# Patient Record
Sex: MALE | Race: WHITE | NOT HISPANIC OR LATINO | Employment: FULL TIME | URBAN - METROPOLITAN AREA
[De-identification: names, ages, dates, MRNs, and addresses within clinical notes are randomized per-mention and may not be internally consistent; named-entity substitution may affect disease eponyms.]

---

## 2018-04-16 ENCOUNTER — TRANSFERRED RECORDS (OUTPATIENT)
Dept: HEALTH INFORMATION MANAGEMENT | Facility: CLINIC | Age: 48
End: 2018-04-16

## 2018-04-25 ENCOUNTER — OFFICE VISIT (OUTPATIENT)
Dept: FAMILY MEDICINE | Facility: CLINIC | Age: 48
End: 2018-04-25
Payer: COMMERCIAL

## 2018-04-25 VITALS
TEMPERATURE: 98.2 F | RESPIRATION RATE: 18 BRPM | HEIGHT: 72 IN | SYSTOLIC BLOOD PRESSURE: 142 MMHG | BODY MASS INDEX: 38.6 KG/M2 | WEIGHT: 285 LBS | HEART RATE: 97 BPM | OXYGEN SATURATION: 97 % | DIASTOLIC BLOOD PRESSURE: 76 MMHG

## 2018-04-25 DIAGNOSIS — Z11.3 SCREENING EXAMINATION FOR VENEREAL DISEASE: ICD-10-CM

## 2018-04-25 DIAGNOSIS — E11.9 TYPE 2 DIABETES MELLITUS WITHOUT COMPLICATION, WITHOUT LONG-TERM CURRENT USE OF INSULIN (H): ICD-10-CM

## 2018-04-25 DIAGNOSIS — E66.01 MORBID OBESITY (H): ICD-10-CM

## 2018-04-25 DIAGNOSIS — M54.42 BILATERAL LOW BACK PAIN WITH LEFT-SIDED SCIATICA, UNSPECIFIED CHRONICITY: ICD-10-CM

## 2018-04-25 DIAGNOSIS — Z01.818 PREOP GENERAL PHYSICAL EXAM: Primary | ICD-10-CM

## 2018-04-25 DIAGNOSIS — Z23 NEED FOR PROPHYLACTIC VACCINATION WITH TETANUS-DIPHTHERIA (TD): ICD-10-CM

## 2018-04-25 LAB
ERYTHROCYTE [DISTWIDTH] IN BLOOD BY AUTOMATED COUNT: 14.5 % (ref 10–15)
HBA1C MFR BLD: 9.6 % (ref 0–5.6)
HCT VFR BLD AUTO: 42.6 % (ref 40–53)
HGB BLD-MCNC: 14.1 G/DL (ref 13.3–17.7)
MCH RBC QN AUTO: 26.9 PG (ref 26.5–33)
MCHC RBC AUTO-ENTMCNC: 33.1 G/DL (ref 31.5–36.5)
MCV RBC AUTO: 81 FL (ref 78–100)
PLATELET # BLD AUTO: 214 10E9/L (ref 150–450)
RBC # BLD AUTO: 5.25 10E12/L (ref 4.4–5.9)
WBC # BLD AUTO: 5.8 10E9/L (ref 4–11)

## 2018-04-25 PROCEDURE — 80048 BASIC METABOLIC PNL TOTAL CA: CPT | Performed by: PHYSICIAN ASSISTANT

## 2018-04-25 PROCEDURE — 85027 COMPLETE CBC AUTOMATED: CPT | Performed by: PHYSICIAN ASSISTANT

## 2018-04-25 PROCEDURE — 90715 TDAP VACCINE 7 YRS/> IM: CPT | Performed by: PHYSICIAN ASSISTANT

## 2018-04-25 PROCEDURE — 82043 UR ALBUMIN QUANTITATIVE: CPT | Performed by: PHYSICIAN ASSISTANT

## 2018-04-25 PROCEDURE — 36415 COLL VENOUS BLD VENIPUNCTURE: CPT | Performed by: PHYSICIAN ASSISTANT

## 2018-04-25 PROCEDURE — 87389 HIV-1 AG W/HIV-1&-2 AB AG IA: CPT | Performed by: PHYSICIAN ASSISTANT

## 2018-04-25 PROCEDURE — 87491 CHLMYD TRACH DNA AMP PROBE: CPT | Performed by: PHYSICIAN ASSISTANT

## 2018-04-25 PROCEDURE — 80061 LIPID PANEL: CPT | Performed by: PHYSICIAN ASSISTANT

## 2018-04-25 PROCEDURE — 99203 OFFICE O/P NEW LOW 30 MIN: CPT | Mod: 25 | Performed by: PHYSICIAN ASSISTANT

## 2018-04-25 PROCEDURE — 86780 TREPONEMA PALLIDUM: CPT | Performed by: PHYSICIAN ASSISTANT

## 2018-04-25 PROCEDURE — 83036 HEMOGLOBIN GLYCOSYLATED A1C: CPT | Performed by: PHYSICIAN ASSISTANT

## 2018-04-25 PROCEDURE — 84443 ASSAY THYROID STIM HORMONE: CPT | Performed by: PHYSICIAN ASSISTANT

## 2018-04-25 PROCEDURE — 87591 N.GONORRHOEAE DNA AMP PROB: CPT | Performed by: PHYSICIAN ASSISTANT

## 2018-04-25 PROCEDURE — 90471 IMMUNIZATION ADMIN: CPT | Performed by: PHYSICIAN ASSISTANT

## 2018-04-25 RX ORDER — METFORMIN HCL 500 MG
1000 TABLET, EXTENDED RELEASE 24 HR ORAL 2 TIMES DAILY WITH MEALS
COMMUNITY
End: 2018-05-07

## 2018-04-25 NOTE — Clinical Note
William CAZARES was to follow up with your office about his 9.6 A1c to see if you would still proceed with the procedure.

## 2018-04-25 NOTE — LETTER
April 30, 2018      Jason Moran  [address] [address]  [facility] [address]        Dear ,    We are writing to inform you of your test results.    - Your blood sugar and A1c are very high.  Please schedule a follow up appointment to discuss starting a second medication or starting insulin.  Your other labs are normal.    Resulted Orders   Basic metabolic panel   Result Value Ref Range    Sodium 137 133 - 144 mmol/L    Potassium 4.0 3.4 - 5.3 mmol/L    Chloride 103 94 - 109 mmol/L    Carbon Dioxide 25 20 - 32 mmol/L    Anion Gap 9 3 - 14 mmol/L    Glucose 277 (H) 70 - 99 mg/dL      Comment:      Non Fasting    Urea Nitrogen 10 7 - 30 mg/dL    Creatinine 0.62 (L) 0.66 - 1.25 mg/dL    GFR Estimate >90 >60 mL/min/1.7m2      Comment:      Non  GFR Calc    GFR Estimate If Black >90 >60 mL/min/1.7m2      Comment:       GFR Calc    Calcium 9.3 8.5 - 10.1 mg/dL   CBC with platelets   Result Value Ref Range    WBC 5.8 4.0 - 11.0 10e9/L    RBC Count 5.25 4.4 - 5.9 10e12/L    Hemoglobin 14.1 13.3 - 17.7 g/dL    Hematocrit 42.6 40.0 - 53.0 %    MCV 81 78 - 100 fl    MCH 26.9 26.5 - 33.0 pg    MCHC 33.1 31.5 - 36.5 g/dL    RDW 14.5 10.0 - 15.0 %    Platelet Count 214 150 - 450 10e9/L   Lipid panel reflex to direct LDL Non-fasting   Result Value Ref Range    Cholesterol 196 <200 mg/dL    Triglycerides 181 (H) <150 mg/dL      Comment:      Borderline high:  150-199 mg/dl  High:             200-499 mg/dl  Very high:       >499 mg/dl  Non Fasting      HDL Cholesterol 45 >39 mg/dL    LDL Cholesterol Calculated 115 (H) <100 mg/dL      Comment:      Above desirable:  100-129 mg/dl  Borderline High:  130-159 mg/dL  High:             160-189 mg/dL  Very high:       >189 mg/dl      Non HDL Cholesterol 151 (H) <130 mg/dL      Comment:      Above Desirable:  130-159 mg/dl  Borderline high:  160-189 mg/dl  High:             190-219 mg/dl  Very high:       >219 mg/dl     Hemoglobin A1c   Result Value Ref Range     Hemoglobin A1C 9.6 (H) 0 - 5.6 %      Comment:      Normal <5.7% Prediabetes 5.7-6.4%  Diabetes 6.5% or higher - adopted from ADA   consensus guidelines.     Neisseria gonorrhoeae PCR   Result Value Ref Range    Specimen Descrip Midstream Urine     N Gonorrhea PCR Negative NEG^Negative      Comment:      Negative for N. gonorrhoeae rRNA by transcription mediated amplification.  A negative result by transcription mediated amplification does not preclude   the presence of N. gonorrhoeae infection because results are dependent on   proper and adequate collection, absence of inhibitors, and sufficient rRNA to   be detected.     Chlamydia trachomatis PCR   Result Value Ref Range    Specimen Description Midstream Urine     Chlamydia Trachomatis PCR Negative NEG^Negative      Comment:      Negative for C. trachomatis rRNA by transcription mediated amplification.  A negative result by transcription mediated amplification does not preclude   the presence of C. trachomatis infection because results are dependent on   proper and adequate collection, absence of inhibitors, and sufficient rRNA to   be detected.     HIV Antigen Antibody Combo   Result Value Ref Range    HIV Antigen Antibody Combo Nonreactive NR^Nonreactive          Comment:      HIV-1 p24 Ag & HIV-1/HIV-2 Ab Not Detected   Anti Treponema   Result Value Ref Range    Treponema pallidum Antibody Negative NEG^Negative   TSH with free T4 reflex   Result Value Ref Range    TSH 1.79 0.40 - 4.00 mU/L   Albumin Random Urine Quantitative with Creat Ratio   Result Value Ref Range    Creatinine Urine 102 mg/dL    Albumin Urine mg/L 12 mg/L    Albumin Urine mg/g Cr 11.76 0 - 17 mg/g Cr       If you have any questions or concerns, please call the clinic at the number listed above.       Sincerely,        Stefani Currie PA-C

## 2018-04-25 NOTE — NURSING NOTE
"Chief Complaint   Patient presents with     Pre-Op Exam       Initial /76  Pulse 97  Temp 98.2  F (36.8  C) (Tympanic)  Resp 18  Ht 5' 11.5\" (1.816 m)  Wt 285 lb (129.3 kg)  SpO2 97%  BMI 39.2 kg/m2 Estimated body mass index is 39.2 kg/(m^2) as calculated from the following:    Height as of this encounter: 5' 11.5\" (1.816 m).    Weight as of this encounter: 285 lb (129.3 kg).  Medication Reconciliation: complete    "

## 2018-04-25 NOTE — PROGRESS NOTES
[facility]  [address]  [facility] [address]  [phone]  Dept: [phone]    PRE-OP EVALUATION:  Today's date: 2018    Jason Moran (: 1970) presents for pre-operative evaluation assessment as requested by Dr. Mk Rueda.  He requires evaluation and anesthesia risk assessment prior to undergoing surgery/procedure for treatment of back pain-DISCECTOMY LUMBAR POSTERIOR MICROSCOPIC ONE LEVEL.    Fax number for surgical facility:   Primary Physician: No Ref-Primary, Physician  Type of Anesthesia Anticipated: General    Patient has a Health Care Directive or Living Will:  NO    Preop Questions 2018   Who is doing your surgery? Dr Mk Rueda   What are you having done? back surgery L5   Date of Surgery/Procedure:    Facility or Hospital where procedure/surgery will be performed: [facility]   1.  Do you have a history of Heart attack, stroke, stent, coronary bypass surgery, or other heart surgery? No   2.  Do you ever have any pain or discomfort in your chest? No   3.  Do you have a history of  Heart Failure? No   4.   Are you troubled by shortness of breath when:  walking on a level surface, or up a slight hill, or at night? No   5.  Do you currently have a cold, bronchitis or other respiratory infection? No   6.  Do you have a cough, shortness of breath, or wheezing? No   7.  Do you sometimes get pains in the calves of your legs when you walk? No   8. Do you or anyone in your family have previous history of blood clots? No   9.  Do you or does anyone in your family have a serious bleeding problem such as prolonged bleeding following surgeries or cuts? No   10. Have you ever had problems with anemia or been told to take iron pills? No   11. Have you had any abnormal blood loss such as black, tarry or bloody stools? No   12. Have you ever had a blood transfusion? No   13. Have you or any of your relatives ever had  problems with anesthesia? No   14. Do you have sleep apnea, excessive snoring or daytime drowsiness? No   15. Do you have any prosthetic heart valves? No   16. Do you have prosthetic joints? No         HPI:     HPI related to upcoming procedure: Previous history of L4 fusion.  Now needs L5 discectomy, due to back pain with left sided sciatica.   No known injury.        See problem list for active medical problems.  Problems all longstanding and stable, except as noted/documented.  See ROS for pertinent symptoms related to these conditions.  Pt notes he is a type 2 diabetic.  Says he checks his blood sugars and they are normal.  He has never had to be on insulin.                                                                                                                                                        .    MEDICAL HISTORY:     Patient Active Problem List    Diagnosis Date Noted     Morbid obesity (H) 04/26/2018     Priority: Medium     Type 2 diabetes mellitus without complication, without long-term current use of insulin (H) 04/25/2018     Priority: Medium     Bilateral low back pain with left-sided sciatica, unspecified chronicity 04/25/2018     Priority: Medium      History reviewed. No pertinent past medical history.  Past Surgical History:   Procedure Laterality Date     ARTHROSCOPY KNEE WITH MENISCAL REPAIR       BACK SURGERY      L4, fusion     Current Outpatient Prescriptions   Medication Sig Dispense Refill     metFORMIN (GLUCOPHAGE-XR) 500 MG 24 hr tablet Take 1,000 mg by mouth 2 times daily (with meals)       OTC products: None, except as noted above    No Known Allergies   Latex Allergy: NO    Social History   Substance Use Topics     Smoking status: Former Smoker     Smokeless tobacco: Never Used     Alcohol use Yes      Comment: 1-2 glasses per week, minimal     History   Drug Use No       REVIEW OF SYSTEMS:   CONSTITUTIONAL: NEGATIVE for fever, chills, change in weight  INTEGUMENTARY/SKIN:  "NEGATIVE for worrisome rashes, moles or lesions  EYES: NEGATIVE for vision changes or irritation  ENT/MOUTH: NEGATIVE for ear, mouth and throat problems  RESP: NEGATIVE for significant cough or SOB  BREAST: NEGATIVE for masses, tenderness or discharge  CV: NEGATIVE for chest pain, palpitations or peripheral edema  GI: NEGATIVE for nausea, abdominal pain, heartburn, or change in bowel habits  : NEGATIVE for frequency, dysuria, or hematuria  MUSCULOSKELETAL:POSITIVE  for back pain   NEURO: POSITIVE for numbness or tingling of the left leg  ENDOCRINE: NEGATIVE for temperature intolerance, skin/hair changes  HEME: NEGATIVE for bleeding problems  PSYCHIATRIC: NEGATIVE for changes in mood or affect    EXAM:   /76  Pulse 97  Temp 98.2  F (36.8  C) (Tympanic)  Resp 18  Ht 5' 11.5\" (1.816 m)  Wt 285 lb (129.3 kg)  SpO2 97%  BMI 39.2 kg/m2    GENERAL APPEARANCE: healthy, alert and no distress     EYES: EOMI,  PERRL     HENT: ear canals and TM's normal and nose and mouth without ulcers or lesions     NECK: no adenopathy, no asymmetry, masses, or scars and thyroid normal to palpation     RESP: lungs clear to auscultation - no rales, rhonchi or wheezes     CV: regular rates and rhythm, normal S1 S2, no S3 or S4 and no murmur, click or rub     ABDOMEN:  soft, nontender, no HSM or masses and bowel sounds normal     MS: extremities normal- no gross deformities noted, no evidence of inflammation in joints, FROM in all extremities.     SKIN: no suspicious lesions or rashes     PSYCH: mentation appears normal. and affect normal/bright     LYMPHATICS: No cervical adenopathy    DIAGNOSTICS:     Labs Resulted Today:   Results for orders placed or performed in visit on 04/25/18   CBC with platelets   Result Value Ref Range    WBC 5.8 4.0 - 11.0 10e9/L    RBC Count 5.25 4.4 - 5.9 10e12/L    Hemoglobin 14.1 13.3 - 17.7 g/dL    Hematocrit 42.6 40.0 - 53.0 %    MCV 81 78 - 100 fl    MCH 26.9 26.5 - 33.0 pg    MCHC 33.1 31.5 - " 36.5 g/dL    RDW 14.5 10.0 - 15.0 %    Platelet Count 214 150 - 450 10e9/L   Hemoglobin A1c   Result Value Ref Range    Hemoglobin A1C 9.6 (H) 0 - 5.6 %       No results for input(s): HGB, PLT, INR, NA, POTASSIUM, CR, A1C in the last 90568 hours.     IMPRESSION:   Reason for surgery/procedure: Bilateral low back pain with left sided sciatica  Diagnosis/reason for consult: Evaluation and anesthesia risk assessment prior to L5 discectomy     The proposed surgical procedure is considered LOW risk.    REVISED CARDIAC RISK INDEX  The patient has the following serious cardiovascular risks for perioperative complications such as (MI, PE, VFib and 3  AV Block):  No serious cardiac risks  INTERPRETATION: 0 risks: Class I (very low risk - 0.4% complication rate)    The patient has the following additional risks for perioperative complications:  No identified additional risks  Morbid obesity      ICD-10-CM    1. Preop general physical exam Z01.818 Basic metabolic panel     CBC with platelets   2. Bilateral low back pain with left-sided sciatica, unspecified chronicity M54.42    3. Type 2 diabetes mellitus without complication, without long-term current use of insulin (H) E11.9 Lipid panel reflex to direct LDL Non-fasting     Hemoglobin A1c     TSH with free T4 reflex     Albumin Random Urine Quantitative with Creat Ratio   4. Morbid obesity (H) E66.01    5. Screening examination for venereal disease Z11.3 Neisseria gonorrhoeae PCR     Chlamydia trachomatis PCR     HIV Antigen Antibody Combo     Anti Treponema   6. Need for prophylactic vaccination with tetanus-diphtheria (TD) Z23 TDAP VACCINE (ADACEL)     VACCINE ADMINISTRATION, INITIAL       RECOMMENDATIONS:       --Patient is to take all scheduled medications on the day of surgery EXCEPT for modifications listed below.    Diabetes Medication Use  -----Hold usual oral and non-insulin diabetic meds (e.g. Metformin, Actos, Glipizide) while NPO.       Anticoagulant or  Antiplatelet Medication Use  NSAIDS: Ibuprofen (Motrin):         Stop one day prior to surgery        APPROVAL GIVEN to proceed with proposed procedure, without further diagnostic evaluation.  Will follow up with surgeon due to high A1c to see if wants to proceed with procedure or start insulin and wait until A1c is lower than 8.5%       Signed Electronically by: Stefani Currie PA-C    Copy of this evaluation report is provided to requesting physician.    [facility] Preop Guidelines    Revised Cardiac Risk Index

## 2018-04-25 NOTE — MR AVS SNAPSHOT
After Visit Summary   4/25/2018    Jason Moran    MRN: [id number]           Patient Information     Date Of Birth          1970        Visit Information        Provider Department      4/25/2018 3:50 PM Stefani Currie PA-C [facility]        Today's Diagnoses     Preop general physical exam    -  1    Bilateral low back pain with left-sided sciatica, unspecified chronicity        Type 2 diabetes mellitus without complication, without long-term current use of insulin (H)        Morbid obesity (H)        Screening examination for venereal disease        Need for prophylactic vaccination with tetanus-diphtheria (TD)          Care Instructions      Before Your Surgery      Call your surgeon if there is any change in your health. This includes signs of a cold or flu (such as a sore throat, runny nose, cough, rash or fever).    Do not smoke, drink alcohol or take over the counter medicine (unless your surgeon or primary care doctor tells you to) for the 24 hours before and after surgery.    If you take prescribed drugs: Follow your doctor s orders about which medicines to take and which to stop until after surgery.    Eating and drinking prior to surgery: follow the instructions from your surgeon    Take a shower or bath the night before surgery. Use the soap your surgeon gave you to gently clean your skin. If you do not have soap from your surgeon, use your regular soap. Do not shave or scrub the surgery site.  Wear clean pajamas and have clean sheets on your bed.           Follow-ups after your visit        Your next 10 appointments already scheduled     Apr 27, 2018   Procedure with Mk Rueda MD   [facility] (--)    [address]  [facility] [id number] [phone]              Who to contact     If you have questions or need follow up information about today's clinic visit or your schedule please contact  "[facility] directly at [phone].  Normal or non-critical lab and imaging results will be communicated to you by MyChart, letter or phone within 4 business days after the clinic has received the results. If you do not hear from us within 7 days, please contact the clinic through MyChart or phone. If you have a critical or abnormal lab result, we will notify you by phone as soon as possible.  Submit refill requests through Ant[employer]ot or call your pharmacy and they will forward the refill request to us. Please allow 3 business days for your refill to be completed.          Additional Information About Your Visit        PlayEnableharFightMe Information     Antidot lets you send messages to your doctor, view your test results, renew your prescriptions, schedule appointments and more. To sign up, go to [email or web address] . Click on \"Log in\" on the left side of the screen, which will take you to the Welcome page. Then click on \"Sign up Now\" on the right side of the page.     You will be asked to enter the access code listed below, as well as some personal information. Please follow the directions to create your username and password.     Your access code is: [email or web address]  Expires: 2018  7:14 AM     Your access code will  in 90 days. If you need help or a new code, please call your [facility] clinic or [phone].        Care EveryWhere ID     This is your Care EveryWhere ID. This could be used by other organizations to access your [facility] medical records  [id number]        Your Vitals Were     Pulse Temperature Respirations Height Pulse Oximetry BMI (Body Mass Index)    97 98.2  F (36.8  C) (Tympanic) 18 5' 11.5\" (1.816 m) 97% 39.2 kg/m2       Blood Pressure from Last 3 Encounters:   18 142/76    Weight from Last 3 Encounters:   18 285 lb (129.3 kg)              We Performed the Following     Albumin Random Urine Quantitative with Creat Ratio     Anti Treponema     Basic " metabolic panel     CBC with platelets     Chlamydia trachomatis PCR     Hemoglobin A1c     HIV Antigen Antibody Combo     Lipid panel reflex to direct LDL Non-fasting     Neisseria gonorrhoeae PCR     TDAP VACCINE (ADACEL)     TSH with free T4 reflex     VACCINE ADMINISTRATION, INITIAL        Primary Care Provider Fax #    Physician No Ref-Primary [phone]       No address on file        Equal Access to Services     SUEEDMOND WILFREDO : Hadii kavitha ku hadasho Soomaali, waaxda luqadaha, qaybta kaalmada adeegyada, mahnaz monroeshikhadonna mckeon. So [facility] [phone].    ATENCIÓN: Si habla español, tiene a tran disposición servicios gratuitos de asistencia lingüística. [facility] [phone].    We comply with applicable federal civil rights laws and [place] laws. We do not discriminate on the basis of race, color, national origin, age, disability, sex, sexual orientation, or gender identity.            Thank you!     Thank you for choosing [facility]  for your care. Our goal is always to provide you with excellent care. Hearing back from our patients is one way we can continue to improve our services. Please take a few minutes to complete the written survey that you may receive in the mail after your visit with us. Thank you!             Your Updated Medication List - Protect others around you: Learn how to safely use, store and throw away your medicines at www.disposemymeds.org.          This list is accurate as of 4/25/18 11:59 PM.  Always use your most recent med list.                   Brand Name Dispense Instructions for use Diagnosis    metFORMIN 500 MG 24 hr tablet    GLUCOPHAGE-XR     Take 1,000 mg by mouth 2 times daily (with meals)

## 2018-04-25 NOTE — NURSING NOTE
Screening Questionnaire for Adult Immunization    Are you sick today?   No   Do you have allergies to medications, food, a vaccine component or latex?   No   Have you ever had a serious reaction after receiving a vaccination?   No   Do you have a long-term health problem with heart disease, lung disease, asthma, kidney disease, metabolic disease (e.g. diabetes), anemia, or other blood disorder?   No   Do you have cancer, leukemia, HIV/AIDS, or any other immune system problem?   No   In the past 3 months, have you taken medications that affect  your immune system, such as prednisone, other steroids, or anticancer drugs; drugs for the treatment of rheumatoid arthritis, Crohn s disease, or psoriasis; or have you had radiation treatments?   No   Have you had a seizure, or a brain or other nervous system problem?   No   During the past year, have you received a transfusion of blood or blood     products, or been given immune (gamma) globulin or antiviral drug?   No   For women: Are you pregnant or is there a chance you could become        pregnant during the next month?   No   Have you received any vaccinations in the past 4 weeks?   No     Immunization questionnaire answers were all negative.        Per orders of MELINDA Jackson, injection of TDAP given by Reena Arce. Patient instructed to remain in clinic for 15 minutes afterwards, and to report any adverse reaction to me immediately.       Screening performed by Reena Arce on 4/25/2018 at 4:15 PM.

## 2018-04-26 PROBLEM — E66.01 MORBID OBESITY (H): Status: ACTIVE | Noted: 2018-04-26

## 2018-04-26 LAB
ANION GAP SERPL CALCULATED.3IONS-SCNC: 9 MMOL/L (ref 3–14)
BUN SERPL-MCNC: 10 MG/DL (ref 7–30)
CALCIUM SERPL-MCNC: 9.3 MG/DL (ref 8.5–10.1)
CHLORIDE SERPL-SCNC: 103 MMOL/L (ref 94–109)
CHOLEST SERPL-MCNC: 196 MG/DL
CO2 SERPL-SCNC: 25 MMOL/L (ref 20–32)
CREAT SERPL-MCNC: 0.62 MG/DL (ref 0.66–1.25)
CREAT UR-MCNC: 102 MG/DL
GFR SERPL CREATININE-BSD FRML MDRD: >90 ML/MIN/1.7M2
GLUCOSE SERPL-MCNC: 277 MG/DL (ref 70–99)
HDLC SERPL-MCNC: 45 MG/DL
LDLC SERPL CALC-MCNC: 115 MG/DL
MICROALBUMIN UR-MCNC: 12 MG/L
MICROALBUMIN/CREAT UR: 11.76 MG/G CR (ref 0–17)
NONHDLC SERPL-MCNC: 151 MG/DL
POTASSIUM SERPL-SCNC: 4 MMOL/L (ref 3.4–5.3)
SODIUM SERPL-SCNC: 137 MMOL/L (ref 133–144)
TRIGL SERPL-MCNC: 181 MG/DL
TSH SERPL DL<=0.005 MIU/L-ACNC: 1.79 MU/L (ref 0.4–4)

## 2018-04-26 NOTE — H&P (VIEW-ONLY)
[facility]  [address]  [facility] [address]  [phone]  Dept: [phone]    PRE-OP EVALUATION:  Today's date: 2018    Jason Moran (: 1970) presents for pre-operative evaluation assessment as requested by Dr. Mk Rueda.  He requires evaluation and anesthesia risk assessment prior to undergoing surgery/procedure for treatment of back pain-DISCECTOMY LUMBAR POSTERIOR MICROSCOPIC ONE LEVEL.    Fax number for surgical facility:   Primary Physician: No Ref-Primary, Physician  Type of Anesthesia Anticipated: General    Patient has a Health Care Directive or Living Will:  NO    Preop Questions 2018   Who is doing your surgery? Dr Mk Rudea   What are you having done? back surgery L5   Date of Surgery/Procedure:    Facility or Hospital where procedure/surgery will be performed: [facility]   1.  Do you have a history of Heart attack, stroke, stent, coronary bypass surgery, or other heart surgery? No   2.  Do you ever have any pain or discomfort in your chest? No   3.  Do you have a history of  Heart Failure? No   4.   Are you troubled by shortness of breath when:  walking on a level surface, or up a slight hill, or at night? No   5.  Do you currently have a cold, bronchitis or other respiratory infection? No   6.  Do you have a cough, shortness of breath, or wheezing? No   7.  Do you sometimes get pains in the calves of your legs when you walk? No   8. Do you or anyone in your family have previous history of blood clots? No   9.  Do you or does anyone in your family have a serious bleeding problem such as prolonged bleeding following surgeries or cuts? No   10. Have you ever had problems with anemia or been told to take iron pills? No   11. Have you had any abnormal blood loss such as black, tarry or bloody stools? No   12. Have you ever had a blood transfusion? No   13. Have you or any of your relatives ever had  problems with anesthesia? No   14. Do you have sleep apnea, excessive snoring or daytime drowsiness? No   15. Do you have any prosthetic heart valves? No   16. Do you have prosthetic joints? No         HPI:     HPI related to upcoming procedure: Previous history of L4 fusion.  Now needs L5 discectomy, due to back pain with left sided sciatica.   No known injury.        See problem list for active medical problems.  Problems all longstanding and stable, except as noted/documented.  See ROS for pertinent symptoms related to these conditions.  Pt notes he is a type 2 diabetic.  Says he checks his blood sugars and they are normal.  He has never had to be on insulin.                                                                                                                                                        .    MEDICAL HISTORY:     Patient Active Problem List    Diagnosis Date Noted     Morbid obesity (H) 04/26/2018     Priority: Medium     Type 2 diabetes mellitus without complication, without long-term current use of insulin (H) 04/25/2018     Priority: Medium     Bilateral low back pain with left-sided sciatica, unspecified chronicity 04/25/2018     Priority: Medium      History reviewed. No pertinent past medical history.  Past Surgical History:   Procedure Laterality Date     ARTHROSCOPY KNEE WITH MENISCAL REPAIR       BACK SURGERY      L4, fusion     Current Outpatient Prescriptions   Medication Sig Dispense Refill     metFORMIN (GLUCOPHAGE-XR) 500 MG 24 hr tablet Take 1,000 mg by mouth 2 times daily (with meals)       OTC products: None, except as noted above    No Known Allergies   Latex Allergy: NO    Social History   Substance Use Topics     Smoking status: Former Smoker     Smokeless tobacco: Never Used     Alcohol use Yes      Comment: 1-2 glasses per week, minimal     History   Drug Use No       REVIEW OF SYSTEMS:   CONSTITUTIONAL: NEGATIVE for fever, chills, change in weight  INTEGUMENTARY/SKIN:  "NEGATIVE for worrisome rashes, moles or lesions  EYES: NEGATIVE for vision changes or irritation  ENT/MOUTH: NEGATIVE for ear, mouth and throat problems  RESP: NEGATIVE for significant cough or SOB  BREAST: NEGATIVE for masses, tenderness or discharge  CV: NEGATIVE for chest pain, palpitations or peripheral edema  GI: NEGATIVE for nausea, abdominal pain, heartburn, or change in bowel habits  : NEGATIVE for frequency, dysuria, or hematuria  MUSCULOSKELETAL:POSITIVE  for back pain   NEURO: POSITIVE for numbness or tingling of the left leg  ENDOCRINE: NEGATIVE for temperature intolerance, skin/hair changes  HEME: NEGATIVE for bleeding problems  PSYCHIATRIC: NEGATIVE for changes in mood or affect    EXAM:   /76  Pulse 97  Temp 98.2  F (36.8  C) (Tympanic)  Resp 18  Ht 5' 11.5\" (1.816 m)  Wt 285 lb (129.3 kg)  SpO2 97%  BMI 39.2 kg/m2    GENERAL APPEARANCE: healthy, alert and no distress     EYES: EOMI,  PERRL     HENT: ear canals and TM's normal and nose and mouth without ulcers or lesions     NECK: no adenopathy, no asymmetry, masses, or scars and thyroid normal to palpation     RESP: lungs clear to auscultation - no rales, rhonchi or wheezes     CV: regular rates and rhythm, normal S1 S2, no S3 or S4 and no murmur, click or rub     ABDOMEN:  soft, nontender, no HSM or masses and bowel sounds normal     MS: extremities normal- no gross deformities noted, no evidence of inflammation in joints, FROM in all extremities.     SKIN: no suspicious lesions or rashes     PSYCH: mentation appears normal. and affect normal/bright     LYMPHATICS: No cervical adenopathy    DIAGNOSTICS:     Labs Resulted Today:   Results for orders placed or performed in visit on 04/25/18   CBC with platelets   Result Value Ref Range    WBC 5.8 4.0 - 11.0 10e9/L    RBC Count 5.25 4.4 - 5.9 10e12/L    Hemoglobin 14.1 13.3 - 17.7 g/dL    Hematocrit 42.6 40.0 - 53.0 %    MCV 81 78 - 100 fl    MCH 26.9 26.5 - 33.0 pg    MCHC 33.1 31.5 - " 36.5 g/dL    RDW 14.5 10.0 - 15.0 %    Platelet Count 214 150 - 450 10e9/L   Hemoglobin A1c   Result Value Ref Range    Hemoglobin A1C 9.6 (H) 0 - 5.6 %       No results for input(s): HGB, PLT, INR, NA, POTASSIUM, CR, A1C in the last 36451 hours.     IMPRESSION:   Reason for surgery/procedure: Bilateral low back pain with left sided sciatica  Diagnosis/reason for consult: Evaluation and anesthesia risk assessment prior to L5 discectomy     The proposed surgical procedure is considered LOW risk.    REVISED CARDIAC RISK INDEX  The patient has the following serious cardiovascular risks for perioperative complications such as (MI, PE, VFib and 3  AV Block):  No serious cardiac risks  INTERPRETATION: 0 risks: Class I (very low risk - 0.4% complication rate)    The patient has the following additional risks for perioperative complications:  No identified additional risks  Morbid obesity      ICD-10-CM    1. Preop general physical exam Z01.818 Basic metabolic panel     CBC with platelets   2. Bilateral low back pain with left-sided sciatica, unspecified chronicity M54.42    3. Type 2 diabetes mellitus without complication, without long-term current use of insulin (H) E11.9 Lipid panel reflex to direct LDL Non-fasting     Hemoglobin A1c     TSH with free T4 reflex     Albumin Random Urine Quantitative with Creat Ratio   4. Morbid obesity (H) E66.01    5. Screening examination for venereal disease Z11.3 Neisseria gonorrhoeae PCR     Chlamydia trachomatis PCR     HIV Antigen Antibody Combo     Anti Treponema   6. Need for prophylactic vaccination with tetanus-diphtheria (TD) Z23 TDAP VACCINE (ADACEL)     VACCINE ADMINISTRATION, INITIAL       RECOMMENDATIONS:       --Patient is to take all scheduled medications on the day of surgery EXCEPT for modifications listed below.    Diabetes Medication Use  -----Hold usual oral and non-insulin diabetic meds (e.g. Metformin, Actos, Glipizide) while NPO.       Anticoagulant or  Antiplatelet Medication Use  NSAIDS: Ibuprofen (Motrin):         Stop one day prior to surgery        APPROVAL GIVEN to proceed with proposed procedure, without further diagnostic evaluation.  Will follow up with surgeon due to high A1c to see if wants to proceed with procedure or start insulin and wait until A1c is lower than 8.5%       Signed Electronically by: Stefani Currie PA-C    Copy of this evaluation report is provided to requesting physician.    [place] Preop Guidelines    Revised Cardiac Risk Index

## 2018-04-27 ENCOUNTER — APPOINTMENT (OUTPATIENT)
Dept: GENERAL RADIOLOGY | Facility: CLINIC | Age: 48
End: 2018-04-27
Attending: ORTHOPAEDIC SURGERY
Payer: COMMERCIAL

## 2018-04-27 ENCOUNTER — HOSPITAL ENCOUNTER (OUTPATIENT)
Facility: CLINIC | Age: 48
Discharge: HOME | End: 2018-04-27
Attending: ORTHOPAEDIC SURGERY | Admitting: ORTHOPAEDIC SURGERY
Payer: COMMERCIAL

## 2018-04-27 ENCOUNTER — SURGERY (OUTPATIENT)
Age: 48
End: 2018-04-27

## 2018-04-27 ENCOUNTER — ANESTHESIA EVENT (OUTPATIENT)
Dept: SURGERY | Facility: CLINIC | Age: 48
End: 2018-04-27
Payer: COMMERCIAL

## 2018-04-27 ENCOUNTER — ANESTHESIA (OUTPATIENT)
Dept: SURGERY | Facility: CLINIC | Age: 48
End: 2018-04-27
Payer: COMMERCIAL

## 2018-04-27 VITALS
WEIGHT: 284.25 LBS | SYSTOLIC BLOOD PRESSURE: 129 MMHG | RESPIRATION RATE: 16 BRPM | OXYGEN SATURATION: 97 % | DIASTOLIC BLOOD PRESSURE: 81 MMHG | HEIGHT: 72 IN | TEMPERATURE: 97.5 F | BODY MASS INDEX: 38.5 KG/M2

## 2018-04-27 DIAGNOSIS — M54.42 BILATERAL LOW BACK PAIN WITH LEFT-SIDED SCIATICA, UNSPECIFIED CHRONICITY: Primary | ICD-10-CM

## 2018-04-27 LAB
C TRACH DNA SPEC QL NAA+PROBE: NEGATIVE
GLUCOSE BLDC GLUCOMTR-MCNC: 177 MG/DL (ref 70–99)
GLUCOSE BLDC GLUCOMTR-MCNC: 190 MG/DL (ref 70–99)
HIV 1+2 AB+HIV1 P24 AG SERPL QL IA: NONREACTIVE
N GONORRHOEA DNA SPEC QL NAA+PROBE: NEGATIVE
SPECIMEN SOURCE: NORMAL
SPECIMEN SOURCE: NORMAL
T PALLIDUM IGG+IGM SER QL: NEGATIVE

## 2018-04-27 PROCEDURE — 37000009 ZZH ANESTHESIA TECHNICAL FEE, EACH ADDTL 15 MIN: Performed by: ORTHOPAEDIC SURGERY

## 2018-04-27 PROCEDURE — 25000128 H RX IP 250 OP 636: Performed by: NURSE ANESTHETIST, CERTIFIED REGISTERED

## 2018-04-27 PROCEDURE — 71000027 ZZH RECOVERY PHASE 2 EACH 15 MINS: Performed by: ORTHOPAEDIC SURGERY

## 2018-04-27 PROCEDURE — 25000128 H RX IP 250 OP 636: Performed by: ORTHOPAEDIC SURGERY

## 2018-04-27 PROCEDURE — 37000008 ZZH ANESTHESIA TECHNICAL FEE, 1ST 30 MIN: Performed by: ORTHOPAEDIC SURGERY

## 2018-04-27 PROCEDURE — 71000012 ZZH RECOVERY PHASE 1 LEVEL 1 FIRST HR: Performed by: ORTHOPAEDIC SURGERY

## 2018-04-27 PROCEDURE — 25000132 ZZH RX MED GY IP 250 OP 250 PS 637: Performed by: ORTHOPAEDIC SURGERY

## 2018-04-27 PROCEDURE — 82962 GLUCOSE BLOOD TEST: CPT

## 2018-04-27 PROCEDURE — 40000170 ZZH STATISTIC PRE-PROCEDURE ASSESSMENT II: Performed by: ORTHOPAEDIC SURGERY

## 2018-04-27 PROCEDURE — 71000013 ZZH RECOVERY PHASE 1 LEVEL 1 EA ADDTL HR: Performed by: ORTHOPAEDIC SURGERY

## 2018-04-27 PROCEDURE — 36000063 ZZH SURGERY LEVEL 4 EA 15 ADDTL MIN: Performed by: ORTHOPAEDIC SURGERY

## 2018-04-27 PROCEDURE — 27210794 ZZH OR GENERAL SUPPLY STERILE: Performed by: ORTHOPAEDIC SURGERY

## 2018-04-27 PROCEDURE — 27210995 ZZH RX 272: Performed by: ORTHOPAEDIC SURGERY

## 2018-04-27 PROCEDURE — 36000093 ZZH SURGERY LEVEL 4 1ST 30 MIN: Performed by: ORTHOPAEDIC SURGERY

## 2018-04-27 PROCEDURE — 25000125 ZZHC RX 250: Performed by: NURSE ANESTHETIST, CERTIFIED REGISTERED

## 2018-04-27 PROCEDURE — 25000125 ZZHC RX 250: Performed by: ORTHOPAEDIC SURGERY

## 2018-04-27 PROCEDURE — 40000985 XR LUMBAR SPINE PORT 1 VW

## 2018-04-27 PROCEDURE — 25000566 ZZH SEVOFLURANE, EA 15 MIN: Performed by: ORTHOPAEDIC SURGERY

## 2018-04-27 PROCEDURE — 25000128 H RX IP 250 OP 636: Performed by: ANESTHESIOLOGY

## 2018-04-27 RX ORDER — DEXAMETHASONE SODIUM PHOSPHATE 4 MG/ML
INJECTION, SOLUTION INTRA-ARTICULAR; INTRALESIONAL; INTRAMUSCULAR; INTRAVENOUS; SOFT TISSUE PRN
Status: DISCONTINUED | OUTPATIENT
Start: 2018-04-27 | End: 2018-04-27

## 2018-04-27 RX ORDER — CEFAZOLIN SODIUM 1 G/50ML
3 SOLUTION INTRAVENOUS
Status: COMPLETED | OUTPATIENT
Start: 2018-04-27 | End: 2018-04-27

## 2018-04-27 RX ORDER — NEOSTIGMINE METHYLSULFATE 1 MG/ML
VIAL (ML) INJECTION PRN
Status: DISCONTINUED | OUTPATIENT
Start: 2018-04-27 | End: 2018-04-27

## 2018-04-27 RX ORDER — ONDANSETRON 2 MG/ML
INJECTION INTRAMUSCULAR; INTRAVENOUS PRN
Status: DISCONTINUED | OUTPATIENT
Start: 2018-04-27 | End: 2018-04-27

## 2018-04-27 RX ORDER — MAGNESIUM HYDROXIDE 1200 MG/15ML
LIQUID ORAL PRN
Status: DISCONTINUED | OUTPATIENT
Start: 2018-04-27 | End: 2018-04-27 | Stop reason: HOSPADM

## 2018-04-27 RX ORDER — ONDANSETRON 4 MG/1
4 TABLET, ORALLY DISINTEGRATING ORAL EVERY 30 MIN PRN
Status: DISCONTINUED | OUTPATIENT
Start: 2018-04-27 | End: 2018-04-27 | Stop reason: HOSPADM

## 2018-04-27 RX ORDER — HYDROMORPHONE HYDROCHLORIDE 1 MG/ML
.3-.5 INJECTION, SOLUTION INTRAMUSCULAR; INTRAVENOUS; SUBCUTANEOUS EVERY 10 MIN PRN
Status: DISCONTINUED | OUTPATIENT
Start: 2018-04-27 | End: 2018-04-27 | Stop reason: HOSPADM

## 2018-04-27 RX ORDER — SODIUM CHLORIDE, SODIUM LACTATE, POTASSIUM CHLORIDE, CALCIUM CHLORIDE 600; 310; 30; 20 MG/100ML; MG/100ML; MG/100ML; MG/100ML
INJECTION, SOLUTION INTRAVENOUS CONTINUOUS PRN
Status: DISCONTINUED | OUTPATIENT
Start: 2018-04-27 | End: 2018-04-27

## 2018-04-27 RX ORDER — HYDROCODONE BITARTRATE AND ACETAMINOPHEN 5; 325 MG/1; MG/1
1-2 TABLET ORAL EVERY 4 HOURS PRN
Qty: 20 TABLET | Refills: 0 | Status: SHIPPED | OUTPATIENT
Start: 2018-04-27 | End: 2018-05-07

## 2018-04-27 RX ORDER — NALOXONE HYDROCHLORIDE 0.4 MG/ML
.1-.4 INJECTION, SOLUTION INTRAMUSCULAR; INTRAVENOUS; SUBCUTANEOUS
Status: DISCONTINUED | OUTPATIENT
Start: 2018-04-27 | End: 2018-04-27 | Stop reason: HOSPADM

## 2018-04-27 RX ORDER — GLYCOPYRROLATE 0.2 MG/ML
INJECTION, SOLUTION INTRAMUSCULAR; INTRAVENOUS PRN
Status: DISCONTINUED | OUTPATIENT
Start: 2018-04-27 | End: 2018-04-27

## 2018-04-27 RX ORDER — HYDROCODONE BITARTRATE AND ACETAMINOPHEN 5; 325 MG/1; MG/1
1 TABLET ORAL ONCE
Status: COMPLETED | OUTPATIENT
Start: 2018-04-27 | End: 2018-04-27

## 2018-04-27 RX ORDER — SODIUM CHLORIDE, SODIUM LACTATE, POTASSIUM CHLORIDE, CALCIUM CHLORIDE 600; 310; 30; 20 MG/100ML; MG/100ML; MG/100ML; MG/100ML
INJECTION, SOLUTION INTRAVENOUS CONTINUOUS
Status: DISCONTINUED | OUTPATIENT
Start: 2018-04-27 | End: 2018-04-27 | Stop reason: HOSPADM

## 2018-04-27 RX ORDER — EPHEDRINE SULFATE 50 MG/ML
INJECTION, SOLUTION INTRAMUSCULAR; INTRAVENOUS; SUBCUTANEOUS PRN
Status: DISCONTINUED | OUTPATIENT
Start: 2018-04-27 | End: 2018-04-27

## 2018-04-27 RX ORDER — CEFAZOLIN SODIUM 2 G/100ML
2 INJECTION, SOLUTION INTRAVENOUS
Status: DISCONTINUED | OUTPATIENT
Start: 2018-04-27 | End: 2018-04-27 | Stop reason: DRUGHIGH

## 2018-04-27 RX ORDER — ONDANSETRON 2 MG/ML
4 INJECTION INTRAMUSCULAR; INTRAVENOUS EVERY 30 MIN PRN
Status: DISCONTINUED | OUTPATIENT
Start: 2018-04-27 | End: 2018-04-27 | Stop reason: HOSPADM

## 2018-04-27 RX ORDER — MEPERIDINE HYDROCHLORIDE 25 MG/ML
12.5 INJECTION INTRAMUSCULAR; INTRAVENOUS; SUBCUTANEOUS
Status: DISCONTINUED | OUTPATIENT
Start: 2018-04-27 | End: 2018-04-27 | Stop reason: HOSPADM

## 2018-04-27 RX ORDER — FENTANYL CITRATE 50 UG/ML
INJECTION, SOLUTION INTRAMUSCULAR; INTRAVENOUS PRN
Status: DISCONTINUED | OUTPATIENT
Start: 2018-04-27 | End: 2018-04-27

## 2018-04-27 RX ORDER — LIDOCAINE HYDROCHLORIDE 20 MG/ML
INJECTION, SOLUTION INFILTRATION; PERINEURAL PRN
Status: DISCONTINUED | OUTPATIENT
Start: 2018-04-27 | End: 2018-04-27

## 2018-04-27 RX ORDER — BUPIVACAINE HYDROCHLORIDE 5 MG/ML
INJECTION, SOLUTION PERINEURAL PRN
Status: DISCONTINUED | OUTPATIENT
Start: 2018-04-27 | End: 2018-04-27 | Stop reason: HOSPADM

## 2018-04-27 RX ORDER — FENTANYL CITRATE 50 UG/ML
25-50 INJECTION, SOLUTION INTRAMUSCULAR; INTRAVENOUS
Status: DISCONTINUED | OUTPATIENT
Start: 2018-04-27 | End: 2018-04-27 | Stop reason: HOSPADM

## 2018-04-27 RX ORDER — CEFAZOLIN SODIUM 1 G/3ML
1 INJECTION, POWDER, FOR SOLUTION INTRAMUSCULAR; INTRAVENOUS SEE ADMIN INSTRUCTIONS
Status: DISCONTINUED | OUTPATIENT
Start: 2018-04-27 | End: 2018-04-27 | Stop reason: HOSPADM

## 2018-04-27 RX ORDER — PROPOFOL 10 MG/ML
INJECTION, EMULSION INTRAVENOUS PRN
Status: DISCONTINUED | OUTPATIENT
Start: 2018-04-27 | End: 2018-04-27

## 2018-04-27 RX ADMIN — FENTANYL CITRATE 50 MCG: 50 INJECTION, SOLUTION INTRAMUSCULAR; INTRAVENOUS at 08:01

## 2018-04-27 RX ADMIN — GLYCOPYRROLATE 0.1 MG: 0.2 INJECTION, SOLUTION INTRAMUSCULAR; INTRAVENOUS at 08:45

## 2018-04-27 RX ADMIN — BUPIVACAINE HYDROCHLORIDE 30 ML: 5 INJECTION, SOLUTION PERINEURAL at 09:22

## 2018-04-27 RX ADMIN — LIDOCAINE HYDROCHLORIDE 100 MG: 20 INJECTION, SOLUTION INFILTRATION; PERINEURAL at 07:35

## 2018-04-27 RX ADMIN — DEXAMETHASONE SODIUM PHOSPHATE 4 MG: 4 INJECTION, SOLUTION INTRA-ARTICULAR; INTRALESIONAL; INTRAMUSCULAR; INTRAVENOUS; SOFT TISSUE at 07:35

## 2018-04-27 RX ADMIN — Medication 5 MG: at 08:52

## 2018-04-27 RX ADMIN — ROCURONIUM BROMIDE 50 MG: 10 INJECTION INTRAVENOUS at 07:35

## 2018-04-27 RX ADMIN — PHENYLEPHRINE HYDROCHLORIDE 100 MCG: 10 INJECTION, SOLUTION INTRAMUSCULAR; INTRAVENOUS; SUBCUTANEOUS at 08:50

## 2018-04-27 RX ADMIN — GLYCOPYRROLATE 0.1 MG: 0.2 INJECTION, SOLUTION INTRAMUSCULAR; INTRAVENOUS at 08:50

## 2018-04-27 RX ADMIN — THROMBIN, TOPICAL (BOVINE) 5000 UNITS: KIT at 08:16

## 2018-04-27 RX ADMIN — FENTANYL CITRATE 50 MCG: 50 INJECTION, SOLUTION INTRAMUSCULAR; INTRAVENOUS at 11:37

## 2018-04-27 RX ADMIN — FENTANYL CITRATE 100 MCG: 50 INJECTION, SOLUTION INTRAMUSCULAR; INTRAVENOUS at 07:35

## 2018-04-27 RX ADMIN — PHENYLEPHRINE HYDROCHLORIDE 100 MCG: 10 INJECTION, SOLUTION INTRAMUSCULAR; INTRAVENOUS; SUBCUTANEOUS at 08:45

## 2018-04-27 RX ADMIN — MIDAZOLAM 2 MG: 1 INJECTION INTRAMUSCULAR; INTRAVENOUS at 07:35

## 2018-04-27 RX ADMIN — PHENYLEPHRINE HYDROCHLORIDE 100 MCG: 10 INJECTION, SOLUTION INTRAMUSCULAR; INTRAVENOUS; SUBCUTANEOUS at 09:18

## 2018-04-27 RX ADMIN — GLYCOPYRROLATE 0.8 MG: 0.2 INJECTION, SOLUTION INTRAMUSCULAR; INTRAVENOUS at 09:25

## 2018-04-27 RX ADMIN — HYDROMORPHONE HYDROCHLORIDE 0.5 MG: 1 INJECTION, SOLUTION INTRAMUSCULAR; INTRAVENOUS; SUBCUTANEOUS at 10:26

## 2018-04-27 RX ADMIN — NEOSTIGMINE METHYLSULFATE 5 MG: 1 INJECTION, SOLUTION INTRAVENOUS at 09:25

## 2018-04-27 RX ADMIN — PHENYLEPHRINE HYDROCHLORIDE 100 MCG: 10 INJECTION, SOLUTION INTRAMUSCULAR; INTRAVENOUS; SUBCUTANEOUS at 08:32

## 2018-04-27 RX ADMIN — PROPOFOL 200 MG: 10 INJECTION, EMULSION INTRAVENOUS at 07:35

## 2018-04-27 RX ADMIN — PHENYLEPHRINE HYDROCHLORIDE 100 MCG: 10 INJECTION, SOLUTION INTRAMUSCULAR; INTRAVENOUS; SUBCUTANEOUS at 09:02

## 2018-04-27 RX ADMIN — HYDROMORPHONE HYDROCHLORIDE 0.5 MG: 1 INJECTION, SOLUTION INTRAMUSCULAR; INTRAVENOUS; SUBCUTANEOUS at 10:16

## 2018-04-27 RX ADMIN — SODIUM CHLORIDE, POTASSIUM CHLORIDE, SODIUM LACTATE AND CALCIUM CHLORIDE: 600; 310; 30; 20 INJECTION, SOLUTION INTRAVENOUS at 07:35

## 2018-04-27 RX ADMIN — HYDROCODONE BITARTRATE AND ACETAMINOPHEN 1 TABLET: 5; 325 TABLET ORAL at 11:30

## 2018-04-27 RX ADMIN — SODIUM CHLORIDE, POTASSIUM CHLORIDE, SODIUM LACTATE AND CALCIUM CHLORIDE: 600; 310; 30; 20 INJECTION, SOLUTION INTRAVENOUS at 08:54

## 2018-04-27 RX ADMIN — DEXMEDETOMIDINE HYDROCHLORIDE 0.2 MCG/KG/HR: 100 INJECTION, SOLUTION INTRAVENOUS at 07:39

## 2018-04-27 RX ADMIN — SODIUM CHLORIDE 1000 ML: 900 IRRIGANT IRRIGATION at 08:16

## 2018-04-27 RX ADMIN — Medication 3 G: at 07:50

## 2018-04-27 RX ADMIN — ONDANSETRON 4 MG: 2 INJECTION INTRAMUSCULAR; INTRAVENOUS at 09:11

## 2018-04-27 RX ADMIN — HYDROMORPHONE HYDROCHLORIDE 0.5 MG: 1 INJECTION, SOLUTION INTRAMUSCULAR; INTRAVENOUS; SUBCUTANEOUS at 10:06

## 2018-04-27 ASSESSMENT — COPD QUESTIONNAIRES: COPD: 0

## 2018-04-27 ASSESSMENT — ENCOUNTER SYMPTOMS: SEIZURES: 0

## 2018-04-27 NOTE — ANESTHESIA POSTPROCEDURE EVALUATION
Patient: Jason Moran    Procedure(s):  LEFT L5-S1 MICRODISCECTOMY   - Wound Class: I-Clean    Diagnosis:DISCHERNIATION  Diagnosis Additional Information: No value filed.    Anesthesia Type:  General, ETT    Note:  Anesthesia Post Evaluation    Patient location during evaluation: PACU  Patient participation: Able to fully participate in evaluation  Level of consciousness: awake, awake and alert and responsive to verbal stimuli  Pain management: adequate  Airway patency: patent  Cardiovascular status: acceptable  Respiratory status: acceptable  Hydration status: acceptable  PONV: none     Anesthetic complications: None          Last vitals:  Vitals:    04/27/18 1145 04/27/18 1200 04/27/18 1315   BP: 118/74 117/80 129/81   Resp: 14 14 16   Temp:      SpO2: 95% 97% 97%         Electronically Signed By: Marge Dalton  April 27, 2018  2:18 PM

## 2018-04-27 NOTE — DISCHARGE INSTRUCTIONS
Same Day Surgery Discharge Instructions for  Sedation and General Anesthesia       It's not unusual to feel dizzy, light-headed or faint for up to 24 hours after surgery or while taking pain medication.  If you have these symptoms: sit for a few minutes before standing and have someone assist you when you get up to walk or use the bathroom.      You should rest and relax for the next 24 hours. We recommend you make arrangements to have an adult stay with you for at least 24 hours after your discharge.  Avoid hazardous and strenuous activity.      DO NOT DRIVE any vehicle or operate mechanical equipment for 24 hours following the end of your surgery.  Even though you may feel normal, your reactions may be affected by the medication you have received.      Do not drink alcoholic beverages for 24 hours following surgery.       Slowly progress to your regular diet as you feel able. It's not unusual to feel nauseated and/or vomit after receiving anesthesia.  If you develop these symptoms, drink clear liquids (apple juice, ginger ale, broth, 7-up, etc. ) until you feel better.  If your nausea and vomiting persists for 24 hours, please notify your surgeon.        All narcotic pain medications, along with inactivity and anesthesia, can cause constipation. Drinking plenty of liquids and increasing fiber intake will help.      For any questions of a medical nature, call your surgeon.      Do not make important decisions for 24 hours.      If you had general anesthesia, you may have a sore throat for a couple of days related to the breathing tube used during surgery.  You may use Cepacol lozenges to help with this discomfort.  If it worsens or if you develop a fever, contact your surgeon.       If you feel your pain is not well managed with the pain medications prescribed by your surgeon, please contact your surgeon's office to let them know so they can address your concerns.       [facility]   Lumbar  Microdiscectomy, Laminectomy or Decompression   Discharge Instructions   Dr. Mk Rueda  Activity:   Walking plays a very important role in your recovery. We encourage you to begin taking short walks as soon as you are discharged from the hospital, increasing your distance and time each day. Two to three weeks after surgery you may want to vary walking with other forms of aerobic activity, such as riding a stationary bike, using a Nordic Track or Stairmaster machine. One of these forms of aerobic activity may be more comfortable than another. You have to experiment and see which one works best for you. What is most important is that you begin some form of aerobic exercise. Your goal for 6 weeks after surgery should be a minimum of 30 minutes, 3 times per week. Two weeks after surgery you may also begin some gentle stretching exercises.  Your discs are the soft, cushioning material between each vertebra (bone) in your back. Aerobic exercise helps with the nutrition of the discs in addition to strengthening the muscles that support your back.  Incision/Dressing:  You have dissolvable sutures beneath the skin, which do not need to be removed. The tape-like strips across your incision are called steri strips. They will eventually fall off on their own and if they haven't done so by the time you come in for your post-operative appointment, we will remove them for you. For your own comfort you may wish to keep a dressing over your incision for a few days. Please inspect your incision once a day and notify our office if there is persistent drainage, swelling and/or redness.  Pain/Pain Medications:   Do not be alarmed if you experience leg pain similar to what you experienced prior to surgery. This is not unusual. During surgery Dr. Rueda worked around the nerve that was compressed which can cause increased nerve irritation. In addition, the disc material that was pressing on the nerve before surgery can be a chemical  irritant.  If you experienced numbness or weakness in your leg before surgery, it may take some time for this to resolve, and may never do so completely. Typically pain resolves first, numbness second, and weakness third.  Your narcotic pain medication will be refilled up to 4-6 weeks after surgery. Over the course of that period you should gradually decrease the amount of pain medications you take each day. After the pain medication is discontinued, Tylenol or ibuprofen should be enough to keep you comfortable.    Bowels/Constipation:   Narcotic pain medication can be very constipating. Make sure you drink plenty of fluids and eat plenty of fresh fruits and vegetables to help decrease constipation. Walking will also help promote normal bowel function. If the constipation continues, you may want to purchase Metamucil or an over the counter suppository at your pharmacy.    Work: Please consult with Dr. Rueda as to when you may return to work. Generally, if your job is sedentary, as opposed to physically strenuous, you may return as soon as you wish.  General Do's and Don'ts:    You may shower once you are discharged from the hospital. You do not need to cover your incision, but do remove your dressing prior to showering.    You may soak in a tub or Jacuzzi after your first post-operative appointment if your incision is healed and not draining.    Avoid excessive bending, twisting, and lifting more than 10 pounds for 2 weeks.    Avoid sitting for more than 1 hour at a time for 2 weeks. If you need to sit longer than 1 hour at a time, make sure you get up and walk around for a few minutes.    You may drive when you are comfortable to do so and you are no longer taking narcotic pain medication.    You may resume sexual activity as comfort allows.    If you develop a fever greater than 100.6 F which lasts more than 2 days, please contact our office.    If you develop difficulties controlling your bowel and/or bladder,  please contact our office.  Your follow up appointment should be scheduled for 10-14 days after surgery. If you have further questions after reviewing these instructions, please feel free to contact Domenica Rogers RN at [phone]. The main number is [phone].

## 2018-04-27 NOTE — OP NOTE
Procedure Date: 04/27/2018      OPERATIVE REPORT       PREOPERATIVE DIAGNOSIS:  Recurrent disk herniation, left L5-S1.      POSTOPERATIVE DIAGNOSIS:  Recurrent disk herniation, left L5-S1.      PROCEDURE:  Redo left L5-S1 microdiscectomy.      SURGEON:  Mk Rueda MD      ASSISTANT:  DAMION Rizvi      DESCRIPTION OF SURGERY:  The patient was placed under general anesthesia, after assurance of adequate anesthetic levels turned prone on the Rafita table.  His back was prepped and draped in the usual fashion.  He did receive 3 grams of cefazolin and this was circulated at the time of incision.  The surgery was made more difficult given his morbid obesity with a BMI of 38.  His previous incision was opened and carried out sharply.  Subperiosteal dissection was carried out over the lamina of L5 and S1 on the left side.  A lateral radiograph was taken with a Penfield 4 dissector directed just proximal to the L5-S1 disk space with the laminotomy performed at the interspace caudal to this.  He was found to have dense dorsal scarring.  His interlaminar opening, however, was found to be quite small.  The previous laminotomy edges were mobilized and a laminotomy expanded caudally over the L5-S1 disk and laterally to the S1 pedicle.  It was carried proximal to the L5-S1 disk.  The S1 nerve root was identified and gently retracted medially over a moderate extruded caudally migrated disk herniation.  These fragments were removed.  The disk space itself was found to be quite narrow and further disk material removed.  The disk space was irrigated, the canal probed.  No other fragments were identified.  The S1 nerve root and main thecal sac were found to be quite free at completion.  The fascia was closed with #0 Vicryl, the subcutaneous tissue closed with 2-0 Vicryl, and the skin with a running subcuticular 3-0 undyed Vicryl.  Steri-Strips followed by a sterile compressive dressing were applied.  He was turned back to  the supine position, awakened, extubated and taken to the recovery room in stable condition.  The blood loss was approximately 25 mL.  The specimen was the L5-S1 disks.  There were no intraoperative complications.         TOMMY KELLY MD             D: 2018   T: 2018   MT: DEENA      Name:     GLO BLANCHARD   MRN:      [id number]        Account:        [id number]   :      1970           Procedure Date: 2018      Document: [id number]

## 2018-04-27 NOTE — BRIEF OP NOTE
[facility] Brief Operative Note    Pre-operative diagnosis: DISCHERNIATION   Post-operative diagnosis * No post-op diagnosis entered *  HNP   Procedure: Procedure(s):  LEFT L5-S1 MICRODISCECTOMY   - Wound Class: I-Clean   Surgeon(s): Surgeon(s) and Role:     * Mk Rueda MD - Primary   Estimated blood loss: 25 mL    Specimens: * No specimens in log *   Findings: HNP

## 2018-04-27 NOTE — IP AVS SNAPSHOT
MRN:[id number]                      After Visit Summary   4/27/2018    Jason Moran    MRN: [id number]           Thank you!     Thank you for choosing [facility] for your care. Our goal is always to provide you with excellent care. Hearing back from our patients is one way we can continue to improve our services. Please take a few minutes to complete the written survey that you may receive in the mail after you visit with us. Thank you!        Patient Information     Date Of Birth          1970        Designated Caregiver       Most Recent Value    Caregiver    Will someone help with your care after discharge? yes    Name of designated caregiver Dannielle Anderson, friend    Phone number of caregiver [phone]      About your hospital stay     You were admitted on:  April 27, 2018 You last received care in the:  [facility] PACU    You were discharged on:  April 27, 2018       Who to Call     For medical emergencies, please call 911.  For non-urgent questions about your medical care, please call your primary care provider or clinic, None  For questions related to your surgery, please call your surgery clinic        Attending Provider     Provider Specialty    Mk Rueda MD Orthopedics       Primary Care Provider Fax #    Physician No Ref-Primary [phone]      Further instructions from your care team       Same Day Surgery Discharge Instructions for  Sedation and General Anesthesia       It's not unusual to feel dizzy, light-headed or faint for up to 24 hours after surgery or while taking pain medication.  If you have these symptoms: sit for a few minutes before standing and have someone assist you when you get up to walk or use the bathroom.      You should rest and relax for the next 24 hours. We recommend you make arrangements to have an adult stay with you for at least 24 hours after your discharge.  Avoid hazardous and strenuous activity.      DO NOT DRIVE any vehicle or  operate mechanical equipment for 24 hours following the end of your surgery.  Even though you may feel normal, your reactions may be affected by the medication you have received.      Do not drink alcoholic beverages for 24 hours following surgery.       Slowly progress to your regular diet as you feel able. It's not unusual to feel nauseated and/or vomit after receiving anesthesia.  If you develop these symptoms, drink clear liquids (apple juice, ginger ale, broth, 7-up, etc. ) until you feel better.  If your nausea and vomiting persists for 24 hours, please notify your surgeon.        All narcotic pain medications, along with inactivity and anesthesia, can cause constipation. Drinking plenty of liquids and increasing fiber intake will help.      For any questions of a medical nature, call your surgeon.      Do not make important decisions for 24 hours.      If you had general anesthesia, you may have a sore throat for a couple of days related to the breathing tube used during surgery.  You may use Cepacol lozenges to help with this discomfort.  If it worsens or if you develop a fever, contact your surgeon.       If you feel your pain is not well managed with the pain medications prescribed by your surgeon, please contact your surgeon's office to let them know so they can address your concerns.       [facility]   Lumbar Microdiscectomy, Laminectomy or Decompression   Discharge Instructions   Dr. Mk Rueda  Activity:   Walking plays a very important role in your recovery. We encourage you to begin taking short walks as soon as you are discharged from the hospital, increasing your distance and time each day. Two to three weeks after surgery you may want to vary walking with other forms of aerobic activity, such as riding a stationary bike, using a Nordic Track or [employer] machine. One of these forms of aerobic activity may be more comfortable than another. You have to experiment and see which one  works best for you. What is most important is that you begin some form of aerobic exercise. Your goal for 6 weeks after surgery should be a minimum of 30 minutes, 3 times per week. Two weeks after surgery you may also begin some gentle stretching exercises.  Your discs are the soft, cushioning material between each vertebra (bone) in your back. Aerobic exercise helps with the nutrition of the discs in addition to strengthening the muscles that support your back.  Incision/Dressing:  You have dissolvable sutures beneath the skin, which do not need to be removed. The tape-like strips across your incision are called steri strips. They will eventually fall off on their own and if they haven't done so by the time you come in for your post-operative appointment, we will remove them for you. For your own comfort you may wish to keep a dressing over your incision for a few days. Please inspect your incision once a day and notify our office if there is persistent drainage, swelling and/or redness.  Pain/Pain Medications:   Do not be alarmed if you experience leg pain similar to what you experienced prior to surgery. This is not unusual. During surgery Dr. Rueda worked around the nerve that was compressed which can cause increased nerve irritation. In addition, the disc material that was pressing on the nerve before surgery can be a chemical irritant.  If you experienced numbness or weakness in your leg before surgery, it may take some time for this to resolve, and may never do so completely. Typically pain resolves first, numbness second, and weakness third.  Your narcotic pain medication will be refilled up to 4-6 weeks after surgery. Over the course of that period you should gradually decrease the amount of pain medications you take each day. After the pain medication is discontinued, Tylenol or ibuprofen should be enough to keep you comfortable.    Bowels/Constipation:   Narcotic pain medication can be very  constipating. Make sure you drink plenty of fluids and eat plenty of fresh fruits and vegetables to help decrease constipation. Walking will also help promote normal bowel function. If the constipation continues, you may want to purchase Metamucil or an over the counter suppository at your pharmacy.    Work: Please consult with Dr. Rueda as to when you may return to work. Generally, if your job is sedentary, as opposed to physically strenuous, you may return as soon as you wish.  General Do's and Don'ts:    You may shower once you are discharged from the hospital. You do not need to cover your incision, but do remove your dressing prior to showering.    You may soak in a tub or Jacuzzi after your first post-operative appointment if your incision is healed and not draining.    Avoid excessive bending, twisting, and lifting more than 10 pounds for 2 weeks.    Avoid sitting for more than 1 hour at a time for 2 weeks. If you need to sit longer than 1 hour at a time, make sure you get up and walk around for a few minutes.    You may drive when you are comfortable to do so and you are no longer taking narcotic pain medication.    You may resume sexual activity as comfort allows.    If you develop a fever greater than 100.6 F which lasts more than 2 days, please contact our office.    If you develop difficulties controlling your bowel and/or bladder, please contact our office.  Your follow up appointment should be scheduled for 10-14 days after surgery. If you have further questions after reviewing these instructions, please feel free to contact Domenica Rogers RN at [phone]. The main number is [phone].        Pending Results     Date and Time Order Name Status Description    4/27/2018 0802 XR Lumbar Spine Port 1 View Preliminary     4/25/2018 1611 HIV ANTIGEN ANTIBODY COMBO In process             Admission Information     Date & Time Provider Department Dept. Phone    4/27/2018 Mk Rueda MD [facility]  [facility] [phone]      Your Vitals Were     Blood Pressure Temperature Respirations Height Weight Pulse Oximetry    113/65 97.5  F (36.4  C) (Temporal) 14 1.829 m (6') 128.9 kg (284 lb 4 oz) 94%    BMI (Body Mass Index)                   38.55 kg/m2           [employer] Information     [employer] lets you send messages to your doctor, view your test results, renew your prescriptions, schedule appointments and more. To sign up, go to [email or web address] . Click on \"Log in\" on the left side of the screen, which will take you to the Welcome page. Then click on \"Sign up Now\" on the right side of the page.     You will be asked to enter the access code listed below, as well as some personal information. Please follow the directions to create your username and password.     Your access code is: [email or web address]  Expires: 2018  7:14 AM     Your access code will  in 90 days. If you need help or a new code, please call your [facility] clinic or [phone].        Care EveryWhere ID     This is your Care EveryWhere ID. This could be used by other organizations to access your [facility] medical records  [id number]        Equal Access to Services     SHELLEY VILLALOBOS AH: Hadii kavitha thompsono Sojovan, waaxda luqadaha, qaybta kaalmada adeegyada, mahnaz mckeon. So [facility] [phone].    ATENCIÓN: Si habla español, tiene a tran disposición servicios gratuitos de asistencia lingüística. Llame al [phone].    We comply with applicable federal civil rights laws and [place] laws. We do not discriminate on the basis of race, color, national origin, age, disability, sex, sexual orientation, or gender identity.               Review of your medicines      START taking        Dose / Directions    HYDROcodone-acetaminophen 5-325 MG per tablet   Commonly known as:  NORCO   Used for:  Bilateral low back pain with left-sided sciatica, unspecified chronicity   Notes to Patient:  One pain pill taken at " 11:30 am.        Dose:  1-2 tablet   Take 1-2 tablets by mouth every 4 hours as needed for pain maximum 10 tablet(s) per day   Quantity:  20 tablet   Refills:  0         CONTINUE these medicines which have NOT CHANGED        Dose / Directions    metFORMIN 500 MG 24 hr tablet   Commonly known as:  GLUCOPHAGE-XR        Dose:  1000 mg   Take 1,000 mg by mouth 2 times daily (with meals)   Refills:  0            Where to get your medicines      Some of these will need a paper prescription and others can be bought over the counter. Ask your nurse if you have questions.     Bring a paper prescription for each of these medications     HYDROcodone-acetaminophen 5-325 MG per tablet                Protect others around you: Learn how to safely use, store and throw away your medicines at www.disposemymeds.org.        Information about OPIOIDS     PRESCRIPTION OPIOIDS: WHAT YOU NEED TO KNOW   You have a prescription for an opioid (narcotic) pain medicine. Opioids can cause addiction. If you have a history of chemical dependency of any type, you are at a higher risk of becoming addicted to opioids. Only take this medicine after all other options have been tried. Take it for as short a time and as few doses as possible.     Do not:    Drive. If you drive while taking these medicines, you could be arrested for driving under the influence (DUI).    Operate heavy machinery    Do any other dangerous activities while taking these medicines.     Drink any alcohol while taking these medicines.      Take with any other medicines that contain acetaminophen. Read all labels carefully. Look for the word  acetaminophen  or  Tylenol.  Ask your pharmacist if you have questions or are unsure.    Store your pills in a secure place, locked if possible. We will not replace any lost or stolen medicine. If you don t finish your medicine, please throw away (dispose) as directed by your pharmacist. The [place] Pollution Control [employer] has more  information about safe disposal: https://www.pca.[facility].[place].us/living-green/managing-unwanted-medications    All opioids tend to cause constipation. Drink plenty of water and eat foods that have a lot of fiber, such as fruits, vegetables, prune juice, apple juice and high-fiber cereal. Take a laxative (Miralax, milk of magnesia, Colace, Senna) if you don t move your bowels at least every other day.              Medication List: This is a list of all your medications and when to take them. Check marks below indicate your daily home schedule. Keep this list as a reference.      Medications           Morning Afternoon Evening Bedtime As Needed    HYDROcodone-acetaminophen 5-325 MG per tablet   Commonly known as:  NORCO   Take 1-2 tablets by mouth every 4 hours as needed for pain maximum 10 tablet(s) per day   Last time this was given:  1 tablet on 4/27/2018 11:30 AM   Notes to Patient:  One pain pill taken at 11:30 am.                                metFORMIN 500 MG 24 hr tablet   Commonly known as:  GLUCOPHAGE-XR   Take 1,000 mg by mouth 2 times daily (with meals)

## 2018-04-27 NOTE — OR NURSING
Assumed cares while primary nurse went on morning break from 0935 to 0950.  Blood glucose =190 at 0934.

## 2018-04-27 NOTE — ANESTHESIA PREPROCEDURE EVALUATION
Procedure: Procedure(s):  DISCECTOMY LUMBAR POSTERIOR MICROSCOPIC ONE LEVEL  Preop diagnosis: DISCHERNIATION    No Known Allergies  Past Medical History:   Diagnosis Date     Diabetes (H)     type II     Neuropathy      Obese      Past Surgical History:   Procedure Laterality Date     ARTHROSCOPY KNEE WITH MENISCAL REPAIR       BACK SURGERY      L4, fusion     Social History   Substance Use Topics     Smoking status: Former Smoker     Types: Cigarettes     Quit date: 1/1/2006     Smokeless tobacco: Never Used     Alcohol use Yes      Comment: 1-2 glasses per week, minimal     Prior to Admission medications    Medication Sig Start Date End Date Taking? Authorizing Provider   metFORMIN (GLUCOPHAGE-XR) 500 MG 24 hr tablet Take 1,000 mg by mouth 2 times daily (with meals)   Yes Reported, Patient     Current Facility-Administered Medications Ordered in Epic   Medication Dose Route Frequency Last Rate Last Dose     ceFAZolin (ANCEF) 1 g vial to attach to  ml bag for ADULT or 50 ml bag for PEDS  1 g Intravenous See Admin Instructions         ceFAZolin (ANCEF) intermittent infusion 3 g (pre-mix)  3 g Intravenous Pre-Op/Pre-procedure x 1 dose         No current [facility]-ordered outpatient prescriptions on file.       Wt Readings from Last 1 Encounters:   04/27/18 128.9 kg (284 lb 4 oz)     Temp Readings from Last 1 Encounters:   04/27/18 36.4  C (97.5  F) (Oral)     BP Readings from Last 6 Encounters:   04/27/18 131/79   04/25/18 142/76     Pulse Readings from Last 4 Encounters:   04/25/18 97     Resp Readings from Last 1 Encounters:   04/27/18 16     SpO2 Readings from Last 1 Encounters:   04/27/18 96%     Recent Labs   Lab Test  04/25/18   1612   NA  137   POTASSIUM  4.0   CHLORIDE  103   CO2  25   ANIONGAP  9   GLC  277*   BUN  10   CR  0.62*   LEEROY  9.3     No results for input(s): AST, ALT, ALKPHOS, BILITOTAL, LIPASE in the last 27155 hours.  Recent Labs   Lab Test  04/25/18   1612   WBC  5.8   HGB  14.1   PLT  214          Anesthesia Evaluation     . Pt has had prior anesthetic.     History of anesthetic complications (sore throat)          ROS/MED HX    ENT/Pulmonary:      (-) asthma, COPD and sleep apnea   Neurologic:      (-) seizures and CVA   Cardiovascular:  - neg cardiovascular ROS       METS/Exercise Tolerance:     Hematologic:         Musculoskeletal:         GI/Hepatic:     (+) GERD      (-) liver disease   Renal/Genitourinary:      (-) renal disease   Endo:     (+) type II DM Obesity, .      Psychiatric:         Infectious Disease:         Malignancy:         Other:                     Physical Exam  Normal systems: dental    Airway   Mallampati: II  TM distance: >3 FB    Dental     Cardiovascular   Rhythm and rate: regular      Pulmonary    breath sounds clear to auscultation                    Anesthesia Plan      History & Physical Review  History and physical reviewed and following examination; no interval change.    ASA Status:  2 .    NPO Status:  > 8 hours    Plan for General and ETT   PONV prophylaxis:  Ondansetron (or other 5HT-3) and Dexamethasone or Solumedrol  Additional equipment: Videolaryngoscope precedex gtt       Postoperative Care      Consents  Anesthetic plan, risks, benefits and alternatives discussed with:  Patient..                          .

## 2018-04-27 NOTE — ANESTHESIA CARE TRANSFER NOTE
Patient: Jason Moran    Procedure(s):  LEFT L5-S1 MICRODISCECTOMY   - Wound Class: I-Clean    Diagnosis: DISCHERNIATION  Diagnosis Additional Information: No value filed.    Anesthesia Type:   General, ETT     Note:  Airway :Face Mask  Patient transferred to:PACU  Comments: Neuromuscular blockade reversed after TOF 4/4, spontaneous respirations, adequate tidal volumes, followed commands to voice, oropharynx suctioned with soft flexible catheter, extubated atraumatically, extubated with suction, airway patent after extubation.  Oxygen via facemask at 6 liters per minute to PACU. Oxygen tubing connected to wall O2 in PACU, SpO2, NiBP, and EKG monitors and alarms on and functioning, Ana Hugger warmer connected to patient gown, report on patient's clinical status given to PACU RN, RN questions answered. Handoff Report: Identifed the Patient, Identified the Reponsible Provider, Reviewed the pertinent medical history, Discussed the surgical course, Reviewed Intra-OP anesthesia mangement and issues during anesthesia, Set expectations for post-procedure period and Allowed opportunity for questions and acknowledgement of understanding      Vitals: (Last set prior to Anesthesia Care Transfer)    CRNA VITALS  4/27/2018 0901 - 4/27/2018 0936      4/27/2018             NIBP: 116/86    Pulse: 91    NIBP Mean: 100    SpO2: 100 %    Resp Rate (observed): (!)  4    Resp Rate (set): 10                Electronically Signed By: MANUEL Simental CRNA  April 27, 2018  9:36 AM

## 2018-04-27 NOTE — OR NURSING
Updated Dr Mckeon with blood sugar of 190  No new orders  Discussed patient being in a lot of pain.  Dr mckeon encouraged use of pain meds as related to  His sizze.

## 2018-04-27 NOTE — IP AVS SNAPSHOT
[facility] [place] [address]    [place] [address]    Phone:  [phone]                                       After Visit Summary   4/27/2018    Jason Moran    MRN: [id number]           After Visit Summary Signature Page     I have received my discharge instructions, and my questions have been answered. I have discussed any challenges I see with this plan with the nurse or doctor.    ..........................................................................................................................................  Patient/Patient Representative Signature      ..........................................................................................................................................  Patient Representative Print Name and Relationship to Patient    ..................................................               ................................................  Date                                            Time    ..........................................................................................................................................  Reviewed by Signature/Title    ...................................................              ..............................................  Date                                                            Time

## 2018-05-07 ENCOUNTER — OFFICE VISIT (OUTPATIENT)
Dept: FAMILY MEDICINE | Facility: CLINIC | Age: 48
End: 2018-05-07
Payer: COMMERCIAL

## 2018-05-07 VITALS
DIASTOLIC BLOOD PRESSURE: 70 MMHG | BODY MASS INDEX: 37.93 KG/M2 | OXYGEN SATURATION: 97 % | TEMPERATURE: 97.7 F | SYSTOLIC BLOOD PRESSURE: 128 MMHG | RESPIRATION RATE: 16 BRPM | HEIGHT: 72 IN | HEART RATE: 89 BPM | WEIGHT: 280 LBS

## 2018-05-07 DIAGNOSIS — E11.65 TYPE 2 DIABETES MELLITUS WITH HYPERGLYCEMIA, WITHOUT LONG-TERM CURRENT USE OF INSULIN (H): ICD-10-CM

## 2018-05-07 DIAGNOSIS — Z00.00 ROUTINE GENERAL MEDICAL EXAMINATION AT A HEALTH CARE FACILITY: Primary | ICD-10-CM

## 2018-05-07 DIAGNOSIS — Z13.89 SCREENING FOR DIABETIC PERIPHERAL NEUROPATHY: ICD-10-CM

## 2018-05-07 PROCEDURE — 99396 PREV VISIT EST AGE 40-64: CPT | Performed by: PHYSICIAN ASSISTANT

## 2018-05-07 RX ORDER — METFORMIN HCL 500 MG
500 TABLET, EXTENDED RELEASE 24 HR ORAL
Qty: 360 TABLET | Refills: 1 | Status: SHIPPED | OUTPATIENT
Start: 2018-05-07 | End: 2020-03-27

## 2018-05-07 RX ORDER — GLIPIZIDE 10 MG/1
10 TABLET, FILM COATED, EXTENDED RELEASE ORAL DAILY
Qty: 60 TABLET | Refills: 1 | Status: CANCELLED | OUTPATIENT
Start: 2018-05-07

## 2018-05-07 RX ORDER — ATORVASTATIN CALCIUM 20 MG/1
20 TABLET, FILM COATED ORAL DAILY
Qty: 30 TABLET | Refills: 1 | Status: CANCELLED | OUTPATIENT
Start: 2018-05-07

## 2018-05-07 NOTE — PROGRESS NOTES
SUBJECTIVE:   CC: Jason Moran is an 47 year old male who presents for preventative health visit.     Physical   Annual:     Getting at least 3 servings of Calcium per day::  Yes    Bi-annual eye exam::  NO    Dental care twice a year::  NO    Sleep apnea or symptoms of sleep apnea::  Daytime drowsiness    Diet::  Diabetic    Frequency of exercise::  None    Taking medications regularly::  Yes    Medication side effects::  None    Additional concerns today::  No            -patient has not been taking metformin, but has been trying to diet as well. Stopped metformin in Nov 2017.  Working on losing weight.  Was 300 lbs, trying to get down to 230.      Today's PHQ-2 Score:   PHQ-2 ( 1999 [employer]) 5/7/2018   Q1: Little interest or pleasure in doing things 0   Q2: Feeling down, depressed or hopeless 0   PHQ-2 Score 0   Q1: Little interest or pleasure in doing things Not at all   Q2: Feeling down, depressed or hopeless Not at all   PHQ-2 Score 0       Abuse: Current or Past(Physical, Sexual or Emotional)- No  Do you feel safe in your environment - Yes    Social History   Substance Use Topics     Smoking status: Former Smoker     Types: Cigarettes     Quit date: 1/1/2006     Smokeless tobacco: Never Used     Alcohol use Yes      Comment: 1-2 glasses per week, minimal     Alcohol Use 5/7/2018   If you drink alcohol do you typically have greater than 3 drinks per day OR greater than 7 drinks per week? No   No flowsheet data found.    Last PSA: No results found for: PSA    Reviewed orders with patient. Reviewed health maintenance and updated orders accordingly - Yes  BP Readings from Last 3 Encounters:   05/07/18 128/70   04/27/18 129/81   04/25/18 142/76    Wt Readings from Last 3 Encounters:   05/07/18 280 lb (127 kg)   04/27/18 284 lb 4 oz (128.9 kg)   04/25/18 285 lb (129.3 kg)                  Recent Labs   Lab Test  04/25/18   1612   A1C  9.6*   LDL  115*   HDL  45   TRIG  181*   CR  0.62*   GFRESTIMATED  >90  "  GFRESTBLACK  >90   POTASSIUM  4.0   TSH  1.79        Reviewed and updated as needed this visit by clinical staff  Tobacco  Allergies  Meds  Problems  Med Hx  Surg Hx  Fam Hx  Soc Hx          Reviewed and updated as needed this visit by Provider  Tobacco  Allergies  Meds  Problems  Med Hx  Surg Hx  Fam Hx  Soc Hx           Review of Systems  C: NEGATIVE for fever, chills, change in weight  I: NEGATIVE for worrisome rashes, moles or lesions  E: NEGATIVE for vision changes or irritation  ENT: NEGATIVE for ear, mouth and throat problems  R: NEGATIVE for significant cough or SOB  CV: NEGATIVE for chest pain, palpitations or peripheral edema  GI: NEGATIVE for nausea, abdominal pain, heartburn, or change in bowel habits   male: negative for dysuria, hematuria, decreased urinary stream, erectile dysfunction, urethral discharge  MUSCULOSKELETAL:POSITIVE  for left knee pain, left leg numbness and lower back pain.  N: NEGATIVE for weakness, dizziness or paresthesias  P: NEGATIVE for changes in mood or affect    OBJECTIVE:   /70  Pulse 89  Temp 97.7  F (36.5  C) (Tympanic)  Resp 16  Ht 5' 11.5\" (1.816 m)  Wt 280 lb (127 kg)  SpO2 97%  BMI 38.51 kg/m2    Physical Exam  GENERAL: healthy, alert and no distress  HENT: ear canals and TM's normal, nose and mouth without ulcers or lesions  NECK: no adenopathy, no asymmetry, masses, or scars and thyroid normal to palpation  RESP: lungs clear to auscultation - no rales, rhonchi or wheezes  CV: regular rate and rhythm, normal S1 S2, no S3 or S4, no murmur, click or rub, no peripheral edema and peripheral pulses strong  MS: no gross musculoskeletal defects noted, no edema  SKIN: no suspicious lesions or rashes  NEURO: Normal strength and tone, mentation intact and speech normal  PSYCH: mentation appears normal, affect normal/bright    ASSESSMENT/PLAN:       ICD-10-CM    1. Routine general medical examination at a health care facility Z00.00    2. Type 2 " "diabetes mellitus with hyperglycemia, without long-term current use of insulin (H) E11.65 metFORMIN (GLUCOPHAGE-XR) 500 MG 24 hr tablet     Hemoglobin A1c   3. Class 2 obesity due to excess calories with serious comorbidity in adult, unspecified BMI E66.01    4. Screening for diabetic peripheral neuropathy Z13.89    Long discussion about diabetes today.  Pt was previously treated at [facility] in [place], [place].  They are not on Epic.  Notes he was previously on glimepiride and that made his blood sugar drop too low.  He was on 1000 mg bid of metformin but stopped taking it last Nov and instead was focusing on his diet.  He was at one time on a weekly injectable which he cannot remember the name.  Thinks his last A1c was about 8.6.    He has had diabetic edu in the past and knows what diet changes he needs to make.  He has been working on losing weight and is down 20 lbs.  He needs to have surgery on his knee and discussed that some surgeons will not do surgery unless he gets his A1c down below 8.    Will restart metformin slowly as pt has had a history of GI side effects when taking.  Will recheck his A1c at a lab only appointment in 7 weeks.  If his A1c is not under 9, will need to start a second medication.    Pt has never been on a medication for his blood pressure or cholesterol and does not want to start one today.    COUNSELING:   Reviewed preventive health counseling, as reflected in patient instructions       reports that he quit smoking about 12 years ago. His smoking use included Cigarettes. He has never used smokeless tobacco.    Estimated body mass index is 38.51 kg/(m^2) as calculated from the following:    Height as of this encounter: 5' 11.5\" (1.816 m).    Weight as of this encounter: 280 lb (127 kg).   Weight management plan: : -30 minutes of exercise 5 days a week (walking, jogging, housework, biking).  - Limit portion sizes and avoid eating out.  -Eat at least 5 servings of fruits and " vegetables daily.   -Eat whole-grain bread, whole-wheat pasta and brown rice instead of white grains and rice.      Counseling Resources:  ATP IV Guidelines  Pooled Cohorts Equation Calculator  FRAX Risk Assessment  ICSI Preventive Guidelines  Dietary Guidelines for Americans, 2010  USDA's MyPlate  ASA Prophylaxis  Lung CA Screening    Stefani Currie PA-C  [facility]  Answers for HPI/ROS submitted by the patient on 5/7/2018   PHQ-2 Score: 0

## 2018-05-07 NOTE — MR AVS SNAPSHOT
After Visit Summary   5/7/2018    Jason Moran    MRN: [id number]           Patient Information     Date Of Birth          1970        Visit Information        Provider Department      5/7/2018 3:50 PM Stefani Currie PA-C [facility]        Today's Diagnoses     Routine general medical examination at a health care facility    -  1    Type 2 diabetes mellitus with hyperglycemia, without long-term current use of insulin (H)        Class 2 obesity due to excess calories with serious comorbidity in adult, unspecified BMI        Screening for diabetic peripheral neuropathy          Care Instructions      Preventive Health Recommendations  Male Ages 40 to 49    Yearly exam:             See your health care provider every year in order to  o   Review health changes.   o   Discuss preventive care.    o   Review your medicines if your doctor has prescribed any.    You should be tested each year for STDs (sexually transmitted diseases) if you re at risk.     Have a cholesterol test every 5 years.     Have a colonoscopy (test for colon cancer) if someone in your family has had colon cancer or polyps before age 50.     After age 45, have a diabetes test (fasting glucose). If you are at risk for diabetes, you should have this test every 3 years.      Talk with your health care provider about whether or not a prostate cancer screening test (PSA) is right for you.    Shots: Get a flu shot each year. Get a tetanus shot every 10 years.     Nutrition:    Eat at least 5 servings of fruits and vegetables daily.     Eat whole-grain bread, whole-wheat pasta and brown rice instead of white grains and rice.     Talk to your provider about Calcium and Vitamin D.     Lifestyle    Exercise for at least 150 minutes a week (30 minutes a day, 5 days a week). This will help you control your weight and prevent disease.     Limit alcohol to one drink per day.     No smoking.     Wear  "sunscreen to prevent skin cancer.     See your dentist every six months for an exam and cleaning.              Follow-ups after your visit        Future tests that were ordered for you today     Open Future Orders        Priority Expected Expires Ordered    Hemoglobin A1c Routine  2019            Who to contact     If you have questions or need follow up information about today's clinic visit or your schedule please contact [facility] directly at [phone].  Normal or non-critical lab and imaging results will be communicated to you by [facility], letter or phone within 4 business days after the clinic has received the results. If you do not hear from us within 7 days, please contact the clinic through [facility] or phone. If you have a critical or abnormal lab result, we will notify you by phone as soon as possible.  Submit refill requests through [employer] or call your pharmacy and they will forward the refill request to us. Please allow 3 business days for your refill to be completed.          Additional Information About Your Visit        [employer] Information     [employer] lets you send messages to your doctor, view your test results, renew your prescriptions, schedule appointments and more. To sign up, go to [email or web address] . Click on \"Log in\" on the left side of the screen, which will take you to the Welcome page. Then click on \"Sign up Now\" on the right side of the page.     You will be asked to enter the access code listed below, as well as some personal information. Please follow the directions to create your username and password.     Your access code is: [email or web address]  Expires: 2018  7:14 AM     Your access code will  in 90 days. If you need help or a new code, please call your [facility] clinic or [phone].        Care EveryWhere ID     This is your Care EveryWhere ID. This could be used by other organizations to access your [facility] medical " "records  [id number]        Your Vitals Were     Pulse Temperature Respirations Height Pulse Oximetry BMI (Body Mass Index)    89 97.7  F (36.5  C) (Tympanic) 16 5' 11.5\" (1.816 m) 97% 38.51 kg/m2       Blood Pressure from Last 3 Encounters:   05/07/18 128/70   04/27/18 129/81   04/25/18 142/76    Weight from Last 3 Encounters:   05/07/18 280 lb (127 kg)   04/27/18 284 lb 4 oz (128.9 kg)   04/25/18 285 lb (129.3 kg)                 Today's Medication Changes          These changes are accurate as of 5/7/18  4:36 PM.  If you have any questions, ask your nurse or doctor.               These medicines have changed or have updated prescriptions.        Dose/Directions    metFORMIN 500 MG 24 hr tablet   Commonly known as:  GLUCOPHAGE-XR   This may have changed:    - how much to take  - when to take this  - additional instructions   Used for:  Type 2 diabetes mellitus with hyperglycemia, without long-term current use of insulin (H)   Changed by:  Stefani Currie PA-C        Dose:  500 mg   Take 1 tablet (500 mg) by mouth daily (with dinner) for 1 week then increase to 1 tab PO bid for 1 week, then 500 mg am and 1000 mg in the pm PO for 1 week, then increase to 2 tab PO bid   Quantity:  360 tablet   Refills:  1            Where to get your medicines      Some of these will need a paper prescription and others can be bought over the counter.  Ask your nurse if you have questions.     Bring a paper prescription for each of these medications     metFORMIN 500 MG 24 hr tablet                Primary Care Provider Fax #    Physician No Ref-Primary [phone]       No address on file        Equal Access to Services     SHELLEY VILLALOBOS : Anthony Ch, dutch bansal, qaisaías kaalmahnaz pena. So [facility] [phone].    ATENCIÓN: Si habla español, tiene a tran disposición servicios gratuitos de asistencia lingüística. Llame al [phone].    We comply with " applicable federal civil rights laws and [place] laws. We do not discriminate on the basis of race, color, national origin, age, disability, sex, sexual orientation, or gender identity.            Thank you!     Thank you for choosing [facility]  for your care. Our goal is always to provide you with excellent care. Hearing back from our patients is one way we can continue to improve our services. Please take a few minutes to complete the written survey that you may receive in the mail after your visit with us. Thank you!             Your Updated Medication List - Protect others around you: Learn how to safely use, store and throw away your medicines at www.disposemymeds.org.          This list is accurate as of 5/7/18  4:36 PM.  Always use your most recent med list.                   Brand Name Dispense Instructions for use Diagnosis    metFORMIN 500 MG 24 hr tablet    GLUCOPHAGE-XR    360 tablet    Take 1 tablet (500 mg) by mouth daily (with dinner) for 1 week then increase to 1 tab PO bid for 1 week, then 500 mg am and 1000 mg in the pm PO for 1 week, then increase to 2 tab PO bid    Type 2 diabetes mellitus with hyperglycemia, without long-term current use of insulin (H)

## 2018-06-04 ENCOUNTER — OFFICE VISIT (OUTPATIENT)
Dept: FAMILY MEDICINE | Facility: CLINIC | Age: 48
End: 2018-06-04
Payer: COMMERCIAL

## 2018-06-04 VITALS
DIASTOLIC BLOOD PRESSURE: 74 MMHG | OXYGEN SATURATION: 98 % | SYSTOLIC BLOOD PRESSURE: 118 MMHG | BODY MASS INDEX: 39.54 KG/M2 | RESPIRATION RATE: 20 BRPM | WEIGHT: 287.5 LBS | TEMPERATURE: 98.5 F | HEART RATE: 101 BPM

## 2018-06-04 DIAGNOSIS — S01.01XD LACERATION OF SCALP, SUBSEQUENT ENCOUNTER: Primary | ICD-10-CM

## 2018-06-04 PROCEDURE — 99213 OFFICE O/P EST LOW 20 MIN: CPT | Performed by: FAMILY MEDICINE

## 2018-06-04 NOTE — PROGRESS NOTES
SUBJECTIVE:   Jason Moran is a 47 year old male who presents to clinic today for the following health issues:    Patient is on phone most of the time while roomed.    Staple Removal       Duration: placed 5/28/18    Description (location/character/radiation): [place] placed at [facility]     Intensity:  0/10    Accompanying signs and symptoms: none    History (similar episodes/previous evaluation): None    Precipitating or alleviating factors: None    Therapies tried and outcome: None   See report in Care Everywhere        Problem list and histories reviewed & adjusted, as indicated.  Additional history: as documented    Labs reviewed in EPIC    Reviewed and updated as needed this visit by clinical staff  Tobacco  Allergies  Meds  Problems  Med Hx  Surg Hx  Fam Hx  Soc Hx        Reviewed and updated as needed this visit by Provider  Allergies  Meds  Problems         ROS:  CONSTITUTIONAL:NEGATIVE for fever, chills, change in weight  INTEGUMENTARY/SKIN: POSITIVE for healing laceration Lt scalp    OBJECTIVE:                                                    /74 (BP Location: Left arm, Patient Position: Sitting, Cuff Size: Adult Large)  Pulse 101  Temp 98.5  F (36.9  C) (Tympanic)  Resp 20  Wt 287 lb 8 oz (130.4 kg)  SpO2 98%  BMI 39.54 kg/m2  Body mass index is 39.54 kg/(m^2).  GENERAL APPEARANCE: healthy, alert and no distress  SKIN: healing laceration.  Staples in place x 6   Removed without difficulty    Diagnostic test results:  none      ASSESSMENT/PLAN:                                                        ICD-10-CM    1. Laceration of scalp, subsequent encounter S01.01XD        Follow up with Provider - as needed    Patient Instructions   Wound care discussed with pt       Waldemar Cristina MD  [facility]

## 2018-06-04 NOTE — MR AVS SNAPSHOT
After Visit Summary   6/4/2018    Jason Moran    MRN: [id number]           Patient Information     Date Of Birth          1970        Visit Information        Provider Department      6/4/2018 3:30 PM Wadlemar Cristina MD [facility]        Today's Diagnoses     Laceration of scalp, subsequent encounter    -  1      Care Instructions    Wound care discussed with pt           Follow-ups after your visit        Your next 10 appointments already scheduled     Jul 02, 2018  4:00 PM CDT   LAB with BX LAB   [facility] ([facility])    [address] [place] [id number] [phone]           Please do not eat 10-12 hours before your appointment if you are coming in fasting for labs on lipids, cholesterol, or glucose (sugar). This does not apply to pregnant women. Water, hot tea and black coffee (with nothing added) are okay. Do not drink other fluids, diet soda or chew gum.              Who to contact     If you have questions or need follow up information about today's clinic visit or your schedule please contact [facility] directly at [phone].  Normal or non-critical lab and imaging results will be communicated to you by MyChart, letter or phone within 4 business days after the clinic has received the results. If you do not hear from us within 7 days, please contact the clinic through MyChart or phone. If you have a critical or abnormal lab result, we will notify you by phone as soon as possible.  Submit refill requests through [employer] or call your pharmacy and they will forward the refill request to us. Please allow 3 business days for your refill to be completed.          Additional Information About Your Visit        Care EveryWhere ID     This is your Care EveryWhere ID. This could be used by other organizations to access your [facility]  records  [id number]        Your Vitals Were     Pulse Temperature Respirations Pulse Oximetry BMI (Body Mass Index)       101 98.5  F (36.9  C) (Tympanic) 20 98% 39.54 kg/m2        Blood Pressure from Last 3 Encounters:   06/04/18 118/74   05/07/18 128/70   04/27/18 129/81    Weight from Last 3 Encounters:   06/04/18 287 lb 8 oz (130.4 kg)   05/07/18 280 lb (127 kg)   04/27/18 284 lb 4 oz (128.9 kg)              Today, you had the following     No orders found for display       Primary Care Provider Fax #    Physician No Ref-Primary [phone]       No address on file        Equal Access to Services     SHELLEY VILLALOBOS : Anthony Ch, dutch bansal, adam kaalmalaurence lakhani, mahnaz salcedo . So [facility] [phone].    ATENCIÓN: Si habla español, tiene a tran disposición servicios gratuitos de asistencia lingüística. Llame al [phone].    We comply with applicable federal civil rights laws and [place] laws. We do not discriminate on the basis of race, color, national origin, age, disability, sex, sexual orientation, or gender identity.            Thank you!     Thank you for choosing [facility]  for your care. Our goal is always to provide you with excellent care. Hearing back from our patients is one way we can continue to improve our services. Please take a few minutes to complete the written survey that you may receive in the mail after your visit with us. Thank you!             Your Updated Medication List - Protect others around you: Learn how to safely use, store and throw away your medicines at www.disposemymeds.org.          This list is accurate as of 6/4/18  4:19 PM.  Always use your most recent med list.                   Brand Name Dispense Instructions for use Diagnosis    metFORMIN 500 MG 24 hr tablet    GLUCOPHAGE-XR    360 tablet    Take 1 tablet (500 mg) by mouth daily (with dinner) for 1 week then increase to 1 tab PO bid  for 1 week, then 500 mg am and 1000 mg in the pm PO for 1 week, then increase to 2 tab PO bid    Type 2 diabetes mellitus with hyperglycemia, without long-term current use of insulin (H)

## 2018-07-17 DIAGNOSIS — E11.65 TYPE 2 DIABETES MELLITUS WITH HYPERGLYCEMIA, WITHOUT LONG-TERM CURRENT USE OF INSULIN (H): ICD-10-CM

## 2018-07-17 RX ORDER — METFORMIN HCL 500 MG
500 TABLET, EXTENDED RELEASE 24 HR ORAL
Qty: 360 TABLET | Refills: 1 | Status: CANCELLED | OUTPATIENT
Start: 2018-07-17

## 2018-07-17 NOTE — TELEPHONE ENCOUNTER
I reached Juvenal was to f/u for A1c and he has scheduled that for Saturday  He is now on 1000 mg BID  Rx approved  Laury Tipton RN- Triage [employer]

## 2018-07-17 NOTE — TELEPHONE ENCOUNTER
Routing refill request to provider for review/approval because:  Needs updated directions on medication.

## 2018-07-17 NOTE — TELEPHONE ENCOUNTER
"metFORMIN (GLUCOPHAGE-XR) 500 MG 24 hr tablet  Last Written Prescription Date:  05/07/18  Last Fill Quantity: 360,  # refills: 1   Last office visit: 6/4/2018 with prescribing provider:  06/04/18   Future Office Visit:    Requested Prescriptions   Pending Prescriptions Disp Refills     metFORMIN (GLUCOPHAGE-XR) 500 MG 24 hr tablet 360 tablet 1     Sig: Take 1 tablet (500 mg) by mouth daily (with dinner) for 1 week then increase to 1 tab PO bid for 1 week, then 500 mg am and 1000 mg in the pm PO for 1 week, then increase to 2 tab PO bid    Biguanide Agents Passed    7/17/2018 12:41 PM       Passed - Blood pressure less than 140/90 in past 6 months    BP Readings from Last 3 Encounters:   06/04/18 118/74   05/07/18 128/70   04/27/18 129/81                Passed - Patient has documented LDL within the past 12 mos.    Recent Labs   Lab Test  04/25/18   1612   LDL  115*            Passed - Patient has had a Microalbumin in the past 12 mos.    Recent Labs   Lab Test  04/25/18   1611   MICROL  12   UMALCR  11.76            Passed - Patient is age 10 or older       Passed - Patient has documented A1c within the specified period of time.    If HgbA1C is 8 or greater, it needs to be on file within the past 3 months.  If less than 8, must be on file within the past 6 months.     Recent Labs   Lab Test  04/25/18   1612   A1C  9.6*            Passed - Patient's CR is NOT>1.4 OR Patient's EGFR is NOT<45 within past 12 mos.    Recent Labs   Lab Test  04/25/18   1612   GFRESTIMATED  >90   GFRESTBLACK  >90       Recent Labs   Lab Test  04/25/18   1612   CR  0.62*            Passed - Patient does NOT have a diagnosis of CHF.       Passed - Recent (6 mo) or future (30 days) visit within the authorizing provider's specialty    Patient had office visit in the last 6 months or has a visit in the next 30 days with authorizing provider or within the authorizing provider's specialty.  See \"Patient Info\" tab in inbasket, or \"Choose Columns\" " in Meds & Orders section of the refill encounter.

## 2018-07-17 NOTE — TELEPHONE ENCOUNTER
Reason for Call:  Medication or medication refill:    Do you use a [facility]?  Name of the pharmacy and phone number for the current request:  [facility] [address] [place] - [phone]    Name of the medication requested: Metformin 500 mg    Other request: Please send to the pharmacy. Patient is out of medication    Can we leave a detailed message on this number? YES    Phone number patient can be reached at: Home number on file [phone] (home)    Best Time: any    Call taken on 7/17/2018 at 12:40 PM by SUZETTE CHRISTIANSON

## 2018-07-21 DIAGNOSIS — E11.65 TYPE 2 DIABETES MELLITUS WITH HYPERGLYCEMIA, WITHOUT LONG-TERM CURRENT USE OF INSULIN (H): ICD-10-CM

## 2018-07-21 LAB — HBA1C MFR BLD: 8.3 % (ref 0–5.6)

## 2018-07-21 PROCEDURE — 36416 COLLJ CAPILLARY BLOOD SPEC: CPT | Performed by: PHYSICIAN ASSISTANT

## 2018-07-21 PROCEDURE — 83036 HEMOGLOBIN GLYCOSYLATED A1C: CPT | Performed by: PHYSICIAN ASSISTANT

## 2018-07-21 NOTE — LETTER
July 23, 2018      Jason Moran  [address] [address]  [place] [facility] [place] [address]        Dear ,    We are writing to inform you of your test results.    - Your A1c has improved but still remains high.  Please schedule a follow up appointment with Dr. Cristina to discuss possibly starting a second medication.    Resulted Orders   Hemoglobin A1c   Result Value Ref Range    Hemoglobin A1C 8.3 (H) 0 - 5.6 %      Comment:      Normal <5.7% Prediabetes 5.7-6.4%  Diabetes 6.5% or higher - adopted from ADA   consensus guidelines.         If you have any questions or concerns, please call the clinic at the number listed above.       Sincerely,      Judi Currie PA-C    Family Medicine  [facility]

## 2018-07-23 NOTE — PROGRESS NOTES
Lab letter printed and signed.  Message comments below:  - Your A1c has improved but still remains high.  Please schedule a follow up appointment with Dr. Cristina to discuss possibly starting a second medication.

## 2018-12-03 ENCOUNTER — APPOINTMENT (OUTPATIENT)
Dept: ULTRASOUND IMAGING | Facility: CLINIC | Age: 48
End: 2018-12-03
Attending: EMERGENCY MEDICINE

## 2018-12-03 ENCOUNTER — APPOINTMENT (OUTPATIENT)
Dept: GENERAL RADIOLOGY | Facility: CLINIC | Age: 48
End: 2018-12-03
Attending: EMERGENCY MEDICINE

## 2018-12-03 ENCOUNTER — HOSPITAL ENCOUNTER (EMERGENCY)
Facility: CLINIC | Age: 48
Discharge: HOME | End: 2018-12-03
Attending: EMERGENCY MEDICINE | Admitting: EMERGENCY MEDICINE

## 2018-12-03 VITALS
WEIGHT: 275 LBS | HEIGHT: 72 IN | RESPIRATION RATE: 17 BRPM | SYSTOLIC BLOOD PRESSURE: 115 MMHG | DIASTOLIC BLOOD PRESSURE: 76 MMHG | OXYGEN SATURATION: 97 % | HEART RATE: 96 BPM | TEMPERATURE: 99.5 F | BODY MASS INDEX: 37.25 KG/M2

## 2018-12-03 DIAGNOSIS — R11.2 NON-INTRACTABLE VOMITING WITH NAUSEA, UNSPECIFIED VOMITING TYPE: ICD-10-CM

## 2018-12-03 DIAGNOSIS — R07.9 ACUTE CHEST PAIN: ICD-10-CM

## 2018-12-03 LAB
ALBUMIN SERPL-MCNC: 3.4 G/DL (ref 3.4–5)
ALP SERPL-CCNC: 93 U/L (ref 40–150)
ALT SERPL W P-5'-P-CCNC: 47 U/L (ref 0–70)
ANION GAP SERPL CALCULATED.3IONS-SCNC: 8 MMOL/L (ref 3–14)
AST SERPL W P-5'-P-CCNC: 23 U/L (ref 0–45)
BASOPHILS # BLD AUTO: 0 10E9/L (ref 0–0.2)
BASOPHILS NFR BLD AUTO: 0.1 %
BILIRUB SERPL-MCNC: 0.6 MG/DL (ref 0.2–1.3)
BUN SERPL-MCNC: 16 MG/DL (ref 7–30)
CALCIUM SERPL-MCNC: 8.4 MG/DL (ref 8.5–10.1)
CHLORIDE SERPL-SCNC: 103 MMOL/L (ref 94–109)
CO2 SERPL-SCNC: 25 MMOL/L (ref 20–32)
CREAT SERPL-MCNC: 0.69 MG/DL (ref 0.66–1.25)
DIFFERENTIAL METHOD BLD: ABNORMAL
EOSINOPHIL # BLD AUTO: 0 10E9/L (ref 0–0.7)
EOSINOPHIL NFR BLD AUTO: 0.1 %
ERYTHROCYTE [DISTWIDTH] IN BLOOD BY AUTOMATED COUNT: 14.8 % (ref 10–15)
GFR SERPL CREATININE-BSD FRML MDRD: >90 ML/MIN/1.7M2
GLUCOSE SERPL-MCNC: 202 MG/DL (ref 70–99)
HCT VFR BLD AUTO: 42.8 % (ref 40–53)
HGB BLD-MCNC: 14.6 G/DL (ref 13.3–17.7)
IMM GRANULOCYTES # BLD: 0 10E9/L (ref 0–0.4)
IMM GRANULOCYTES NFR BLD: 0.3 %
INTERPRETATION ECG - MUSE: NORMAL
LIPASE SERPL-CCNC: 74 U/L (ref 73–393)
LYMPHOCYTES # BLD AUTO: 0.4 10E9/L (ref 0.8–5.3)
LYMPHOCYTES NFR BLD AUTO: 4.6 %
MCH RBC QN AUTO: 27.2 PG (ref 26.5–33)
MCHC RBC AUTO-ENTMCNC: 34.1 G/DL (ref 31.5–36.5)
MCV RBC AUTO: 80 FL (ref 78–100)
MONOCYTES # BLD AUTO: 0.3 10E9/L (ref 0–1.3)
MONOCYTES NFR BLD AUTO: 4.3 %
NEUTROPHILS # BLD AUTO: 7.2 10E9/L (ref 1.6–8.3)
NEUTROPHILS NFR BLD AUTO: 90.6 %
NRBC # BLD AUTO: 0 10*3/UL
NRBC BLD AUTO-RTO: 0 /100
PLATELET # BLD AUTO: 243 10E9/L (ref 150–450)
POTASSIUM SERPL-SCNC: 3.5 MMOL/L (ref 3.4–5.3)
PROT SERPL-MCNC: 7.5 G/DL (ref 6.8–8.8)
RBC # BLD AUTO: 5.37 10E12/L (ref 4.4–5.9)
SODIUM SERPL-SCNC: 136 MMOL/L (ref 133–144)
TROPONIN I SERPL-MCNC: <0.015 UG/L (ref 0–0.04)
WBC # BLD AUTO: 7.9 10E9/L (ref 4–11)

## 2018-12-03 PROCEDURE — 25000128 H RX IP 250 OP 636: Performed by: EMERGENCY MEDICINE

## 2018-12-03 PROCEDURE — 71046 X-RAY EXAM CHEST 2 VIEWS: CPT

## 2018-12-03 PROCEDURE — 76705 ECHO EXAM OF ABDOMEN: CPT

## 2018-12-03 PROCEDURE — 93005 ELECTROCARDIOGRAM TRACING: CPT

## 2018-12-03 PROCEDURE — 96361 HYDRATE IV INFUSION ADD-ON: CPT

## 2018-12-03 PROCEDURE — 83690 ASSAY OF LIPASE: CPT | Performed by: EMERGENCY MEDICINE

## 2018-12-03 PROCEDURE — 85025 COMPLETE CBC W/AUTO DIFF WBC: CPT | Performed by: EMERGENCY MEDICINE

## 2018-12-03 PROCEDURE — 80053 COMPREHEN METABOLIC PANEL: CPT | Performed by: EMERGENCY MEDICINE

## 2018-12-03 PROCEDURE — 84484 ASSAY OF TROPONIN QUANT: CPT | Performed by: EMERGENCY MEDICINE

## 2018-12-03 PROCEDURE — 99285 EMERGENCY DEPT VISIT HI MDM: CPT | Mod: 25

## 2018-12-03 PROCEDURE — 96374 THER/PROPH/DIAG INJ IV PUSH: CPT

## 2018-12-03 RX ORDER — ONDANSETRON 4 MG/1
4 TABLET, ORALLY DISINTEGRATING ORAL EVERY 4 HOURS PRN
Qty: 10 TABLET | Refills: 0 | Status: SHIPPED | OUTPATIENT
Start: 2018-12-03

## 2018-12-03 RX ORDER — ONDANSETRON 2 MG/ML
4 INJECTION INTRAMUSCULAR; INTRAVENOUS ONCE
Status: COMPLETED | OUTPATIENT
Start: 2018-12-03 | End: 2018-12-03

## 2018-12-03 RX ADMIN — ONDANSETRON 4 MG: 2 INJECTION INTRAMUSCULAR; INTRAVENOUS at 09:35

## 2018-12-03 RX ADMIN — SODIUM CHLORIDE 1000 ML: 9 INJECTION, SOLUTION INTRAVENOUS at 09:34

## 2018-12-03 ASSESSMENT — ENCOUNTER SYMPTOMS
NECK PAIN: 1
BLOOD IN STOOL: 0
FEVER: 0
CHILLS: 1
SHORTNESS OF BREATH: 0
VOMITING: 1
DIARRHEA: 0
NAUSEA: 1
ABDOMINAL PAIN: 0

## 2018-12-03 NOTE — LETTER
December 3, 2018      To Whom It May Concern:      Jason Moran was seen in our Emergency Department today, 12/03/18.  I expect his condition to improve over the next 2 days.  He may return to work/school when improved.    Sincerely,        Nikki Briggs MD

## 2018-12-03 NOTE — ED AVS SNAPSHOT
Emergency Department    [address] [place] [address]    Phone:  [phone]    Fax:  [phone]                                       Jason Moran   MRN: [id number]    Department:   Emergency Department   Date of Visit:  12/3/2018           After Visit Summary Signature Page     I have received my discharge instructions, and my questions have been answered. I have discussed any challenges I see with this plan with the nurse or doctor.    ..........................................................................................................................................  Patient/Patient Representative Signature      ..........................................................................................................................................  Patient Representative Print Name and Relationship to Patient    ..................................................               ................................................  Date                                   Time    ..........................................................................................................................................  Reviewed by Signature/Title    ...................................................              ..............................................  Date                                               Time          22EPIC Rev 08/18

## 2018-12-03 NOTE — ED AVS SNAPSHOT
Emergency Department    [address] [facility] [address]    Phone:  [phone]    Fax:  [phone]                                       Jason Moran   MRN: [id number]    Department:   Emergency Department   Date of Visit:  12/3/2018           Patient Information     Date Of Birth          1970        Your diagnoses for this visit were:     Non-intractable vomiting with nausea, unspecified vomiting type     Acute chest pain        You were seen by Nikki Briggs MD.      Follow-up Information     Follow up with Waldemar Cristina MD.    Specialty:  Family Practice    Why:  tomorrow for recheck    Contact information:    [address] [facility] [address] [phone]          Follow up with  Emergency Department.    Specialty:  EMERGENCY MEDICINE    Why:  As needed, If symptoms worsen    Contact information:    [address] [facility] [address] [phone]      Discharge References/Attachments     VOMITING (ADULT) (ENGLISH)    CHEST PAIN, UNCERTAIN CAUSE (ENGLISH)      Your next 10 appointments already scheduled     Dec 07, 2018  3:45 PM CST   Office Visit with Waldemar Cristina MD   [facility] ([facility]    [address] [facility] [address]  [facility] [address] [phone]           Bring a current list of meds and any records pertaining to this visit. For Physicals, please bring immunization records and any forms needing to be filled out. Please arrive 10 minutes early to complete paperwork.              24 Hour Appointment Hotline       To make an appointment at any [facility], call [phone] ([phone]). If you don't have a family doctor or clinic, we will help you find one. [facility] are conveniently located to serve the needs of you and your family.             Review of your medicines      START taking        Dose / Directions Last dose taken    ondansetron  4 MG ODT tab   Commonly known as:  ZOFRAN ODT   Dose:  4 mg   Quantity:  10 tablet        Take 1 tablet (4 mg) by mouth every 4 hours as needed for nausea   Refills:  0          Our records show that you are taking the medicines listed below. If these are incorrect, please call your family doctor or clinic.        Dose / Directions Last dose taken    * metFORMIN 500 MG 24 hr tablet   Commonly known as:  GLUCOPHAGE-XR   Dose:  500 mg   Quantity:  360 tablet        Take 1 tablet (500 mg) by mouth daily (with dinner) for 1 week then increase to 1 tab PO bid for 1 week, then 500 mg am and 1000 mg in the pm PO for 1 week, then increase to 2 tab PO bid   Refills:  1        * metFORMIN 1000 MG tablet   Commonly known as:  GLUCOPHAGE   Dose:  1000 mg   Quantity:  180 tablet        Take 1 tablet (1,000 mg) by mouth 2 times daily (with meals)   Refills:  1        * Notice:  This list has 2 medication(s) that are the same as other medications prescribed for you. Read the directions carefully, and ask your doctor or other care provider to review them with you.            Prescriptions were sent or printed at these locations (1 Prescription)                   Other Prescriptions                Printed at Department/Unit printer (1 of 1)         ondansetron (ZOFRAN ODT) 4 MG ODT tab                Procedures and tests performed during your visit     Abdomen US, limited (RUQ only)    CBC with platelets differential    Chest XR,  PA & LAT    Comprehensive metabolic panel    EKG 12 lead    Lipase    Troponin I      Orders Needing Specimen Collection     None      Pending Results     No orders found from 12/1/2018 to 12/4/2018.            Pending Culture Results     No orders found from 12/1/2018 to 12/4/2018.            Pending Results Instructions     If you had any lab results that were not finalized at the time of your Discharge, you can call the ED Lab Result RN at [phone]. You will be contacted by this team for any  positive Lab results or changes in treatment. The nurses are available 7 days a week from 10A to 6:30P.  You can leave a message 24 hours per day and they will return your call.        Test Results From Your Hospital Stay        12/3/2018  9:05 AM      Component Results     Component Value Ref Range & Units Status    WBC 7.9 4.0 - 11.0 10e9/L Final    RBC Count 5.37 4.4 - 5.9 10e12/L Final    Hemoglobin 14.6 13.3 - 17.7 g/dL Final    Hematocrit 42.8 40.0 - 53.0 % Final    MCV 80 78 - 100 fl Final    MCH 27.2 26.5 - 33.0 pg Final    MCHC 34.1 31.5 - 36.5 g/dL Final    RDW 14.8 10.0 - 15.0 % Final    Platelet Count 243 150 - 450 10e9/L Final    Diff Method Automated Method  Final    % Neutrophils 90.6 % Final    % Lymphocytes 4.6 % Final    % Monocytes 4.3 % Final    % Eosinophils 0.1 % Final    % Basophils 0.1 % Final    % Immature Granulocytes 0.3 % Final    Nucleated RBCs 0 0 /100 Final    Absolute Neutrophil 7.2 1.6 - 8.3 10e9/L Final    Absolute Lymphocytes 0.4 (L) 0.8 - 5.3 10e9/L Final    Absolute Monocytes 0.3 0.0 - 1.3 10e9/L Final    Absolute Eosinophils 0.0 0.0 - 0.7 10e9/L Final    Absolute Basophils 0.0 0.0 - 0.2 10e9/L Final    Abs Immature Granulocytes 0.0 0 - 0.4 10e9/L Final    Absolute Nucleated RBC 0.0  Final         12/3/2018  9:25 AM      Component Results     Component Value Ref Range & Units Status    Sodium 136 133 - 144 mmol/L Final    Potassium 3.5 3.4 - 5.3 mmol/L Final    Chloride 103 94 - 109 mmol/L Final    Carbon Dioxide 25 20 - 32 mmol/L Final    Anion Gap 8 3 - 14 mmol/L Final    Glucose 202 (H) 70 - 99 mg/dL Final    Urea Nitrogen 16 7 - 30 mg/dL Final    Creatinine 0.69 0.66 - 1.25 mg/dL Final    GFR Estimate >90 >60 mL/min/1.7m2 Final    Non  GFR Calc    GFR Estimate If Black >90 >60 mL/min/1.7m2 Final    African American GFR Calc    Calcium 8.4 (L) 8.5 - 10.1 mg/dL Final    Bilirubin Total 0.6 0.2 - 1.3 mg/dL Final    Albumin 3.4 3.4 - 5.0 g/dL Final    Protein  Total 7.5 6.8 - 8.8 g/dL Final    Alkaline Phosphatase 93 40 - 150 U/L Final    ALT 47 0 - 70 U/L Final    AST 23 0 - 45 U/L Final         12/3/2018  9:25 AM      Component Results     Component Value Ref Range & Units Status    Troponin I ES <0.015 0.000 - 0.045 ug/L Final    The 99th percentile for upper reference range is 0.045 ug/L.  Troponin values   in the range of 0.045 - 0.120 ug/L may be associated with risks of adverse   clinical events.           12/3/2018 10:33 AM      Narrative     CHEST TWO VIEWS  12/3/2018 9:51 AM     HISTORY:  Right-sided chest pain and vomiting.     COMPARISON: None.        Impression     IMPRESSION: PA and lateral views of the chest. Lungs are clear. Heart  is normal in size. No effusions are evident. No pneumothorax.    STARLA MORA MD         12/3/2018 11:35 AM      Narrative     ULTRASOUND ABDOMEN LIMITED 12/3/2018 10:58 AM     HISTORY: Check for cholecystitis, right chest pain and vomiting.     FINDINGS:  Liver is increased in echogenicity without focal lesions.  Probable areas of focal fatty sparing about the gallbladder fossa. The  gallbladder is normal without stones or sludge. Common bile duct is  normal in diameter. Pancreas is normal where visualized. Examination  of the right kidney is unremarkable.        Impression     IMPRESSION:  Fatty infiltration of liver. No gallstones or bile duct  dilatation.    STARLA MORA MD         12/3/2018  9:28 AM      Component Results     Component Value Ref Range & Units Status    Lipase 74 73 - 393 U/L Final                Clinical Quality Measure: Blood Pressure Screening     Your blood pressure was checked while you were in the emergency department today. The last reading we obtained was  BP: 115/76 . Please read the guidelines below about what these numbers mean and what you should do about them.  If your systolic blood pressure (the top number) is less than 120 and your diastolic blood pressure (the bottom number) is less than 80,  "then your blood pressure is normal. There is nothing more that you need to do about it.  If your systolic blood pressure (the top number) is 120-139 or your diastolic blood pressure (the bottom number) is 80-89, your blood pressure may be higher than it should be. You should have your blood pressure rechecked within a year by a primary care provider.  If your systolic blood pressure (the top number) is 140 or greater or your diastolic blood pressure (the bottom number) is 90 or greater, you may have high blood pressure. High blood pressure is treatable, but if left untreated over time it can put you at risk for heart attack, stroke, or kidney failure. You should have your blood pressure rechecked by a primary care provider within the next 4 weeks.  If your provider in the emergency department today gave you specific instructions to follow-up with your doctor or provider even sooner than that, you should follow that instruction and not wait for up to 4 weeks for your follow-up visit.        Thank you for choosing [facility]       Thank you for choosing [facility] for your care. Our goal is always to provide you with excellent care. Hearing back from our patients is one way we can continue to improve our services. Please take a few minutes to complete the written survey that you may receive in the mail after you visit with us. Thank you!        [employer] Information     [employer] lets you send messages to your doctor, view your test results, renew your prescriptions, schedule appointments and more. To sign up, go to [email or web address] for [employer] . Click on \"Log in\" on the left side of the screen, which will take you to the Welcome page. Then click on \"Sign up Now\" on the right side of the page.     You will be asked to enter the access code listed below, as well as some personal information. Please follow the directions to create your username and password.     Your access code is: [email or web address]  Expires: 3/3/2019  9:44 AM     Your " access code will  in 90 days. If you need help or a new code, please call your [facility] clinic or [phone].        Care EveryWhere ID     This is your Care EveryWhere ID. This could be used by other organizations to access your [facility] medical records  [id number]        Equal Access to Services     SHELLEY VILLALOBOS : Anthony thompsono Sojovan, waaxda luqadaha, qaybta kaalmada lesvia, mahnaz mckeon. So [facility] [phone].    ATENCIÓN: Si habla español, tiene a tran disposición servicios gratuitos de asistencia lingüística. Llame al [phone].    We comply with applicable federal civil rights laws and [place] laws. We do not discriminate on the basis of race, color, national origin, age, disability, sex, sexual orientation, or gender identity.            After Visit Summary       This is your record. Keep this with you and show to your community pharmacist(s) and doctor(s) at your next visit.

## 2018-12-03 NOTE — ED PROVIDER NOTES
"  History     Chief Complaint:  Chest Pain    HPI   Jason Moran is a 48 year old male who presents to the emergency department today for evaluation of chest pain. The patient reports that he spent the day with his grandson yesterday and ate a burger and brownies with him around 0300. He explains that around 0500 he developed a right sided chest pain with radiation into \"the center\" of his neck, nausea, emesis, and chills, all of which have persisted since onset, prompting presentation. Here the patient reports that he consumes alcohol on occasion. He denies any leg pain or swelling, shortness of breath, fever, blood in his stools, abdominal pain, or diarrhea.     Cardiac/PE/DVT Risk Factors:  History of hypertension - \"elevated\" though not medicated for this  History of hyperlipidemia - negative  History of diabetes - positive  History of smoking - positive  Personal history of PE/DVT - negative  Recent travel - negative    Allergies:  No Known Drug Allergies     Medications:    Metformin    Past Medical History:    Morbid obesity  Type II diabetes  Bilateral low back pain with left sided sciatica  Neuropathy  Elevated blood pressure  Frequent   Denies history of gallstones  Denies history of issues with pancreas    Past Surgical History:    Arthroscopy knee with meniscal repair  L4 fusion  Discectomy lumbar posterior microscopic one level  Denies abdominal surgeries    Family History:    Family history reviewed. No pertinent family history.    Social History:  Smoking Status: Former Smoker   Types: Cigarettes   Quit day: 1/1/06  Smokeless Tobacco: Never Used  Alcohol Use: Positive  Marital Status:  Single      Review of Systems   Constitutional: Positive for chills. Negative for fever.   Respiratory: Negative for shortness of breath.    Cardiovascular: Positive for chest pain. Negative for leg swelling.   Gastrointestinal: Positive for nausea and vomiting. Negative for abdominal pain, blood in stool and diarrhea. "   Musculoskeletal: Positive for neck pain.        No leg pain   All other systems reviewed and are negative.    Physical Exam     Patient Vitals for the past 24 hrs:   BP Temp Temp src Pulse Heart Rate Resp SpO2 Height Weight   12/03/18 1057 115/76 - - - - - - - -   12/03/18 1014 126/76 - - - - - - - -   12/03/18 0934 132/83 - - - 86 17 97 % - -   12/03/18 0847 131/84 - - - - - - - -   12/03/18 0844 - 99.5  F (37.5  C) Oral 96 - 16 99 % 1.829 m (6') 124.7 kg (275 lb)     Physical Exam  Nursing note and vitals reviewed.  Constitutional:  Appears well-developed and well-nourished.   HENT:   Head:    Atraumatic.   Mouth/Throat:   Oropharynx is clear and moist. No oropharyngeal exudate.   Eyes:    Pupils are equal, round, and reactive to light.   Neck:    Normal range of motion. Neck supple.      No tracheal deviation present. No thyromegaly present.   Cardiovascular:  Normal rate, regular rhythm, no murmur   Pulmonary/Chest: Breath sounds are clear and equal without wheezes or crackles.  Abdominal:   Soft. Bowel sounds are normal. Exhibits no distension and      no mass. There is no tenderness.      There is no rebound and no guarding.   Musculoskeletal:  Exhibits no edema.   Lymphadenopathy:  No cervical adenopathy.   Neurological:   Alert and oriented to person, place, and time. GCS 15.  CN 2-12 intact.  and proximal upper extremity strength strong and equal.  Bilateral lower extremity strength strong and equal, including strong dorsiflexion and plantarflexion strength.  Sensation intact and equal to the face, arms and legs.  No facial droop or weakness. Normal speech.  Follows commands and answers questions normally.    Skin:    Skin is warm and dry. No rash noted. No pallor.     Emergency Department Course     ECG:  ECG taken at 0902, ECG read at 0908  Normal sinus rhythm  Minimal voltage criteria for LVH, may be normal variant  Nonspecific T wave abnormality  Abnormal ECG  Rate 87 bpm. MN interval 136 ms. QRS  duration 80 ms. QT/QTc 360/433 ms. P-R-T axes 40 13 -7.    Imaging:  Radiology findings were communicated with the patient who voiced understanding of the findings.    Abdomen US, limited (RUQ only)  Fatty infiltration of liver. No gallstones or bile duct  dilatation.  STARLA MORA MD  Reading per radiology    Chest XR,  PA & LAT  PA and lateral views of the chest. Lungs are clear. Heart  is normal in size. No effusions are evident. No pneumothorax.  STARLA MORA MD  Reading per radiology    Laboratory:  Laboratory findings were communicated with the patient who voiced understanding of the findings.    CBC: WBC 7.9, HGB 14.6,   CMP: Glucose 202 (H), Calcium 8.4 (L) o/w WNL (Creatinine 0.69)  Troponin (Collected: 0849): <0.015  Lipase: 74    Interventions:  0934 NS 1000 ml IV  0935 Zofran 4 mg IV    Emergency Department Course:    0849 IV was inserted and blood was drawn for laboratory testing, results above.    0858 Nursing notes and vitals reviewed.    0902 EKG obtained as noted above.    0912 I performed an exam of the patient as documented above.     0928 The patient was sent for a chest xray while in the emergency department, results above.     1030 The patient was sent for an abdominal ultrasound while in the emergency department, results above.    1241 Recheck and update.     1253 I personally reviewed the laboratory and imaging results with the patient and answered all related questions prior to discharge.    Impression & Plan      Medical Decision Making:  Jason Moran is a 48 year old male who presents to the emergency department today for evaluation of chest pain. I feel that his symptoms are likely due to gastritis, either viral or food poisoning. I feel that the chest pain is likely due to the vomiting. I did not find him to have any sign of myocardial infarction or acute coronary syndrome. His ECG was unremarkable. Troponin was normal. His symptoms resolved with zofran, and he was feeling much  improved. He has no abdominal tenderness and a benign abdominal exam, so I did not feel CT imaging was warranted. Gall bladder ultrasound was performed and did not show any evidence of cholecystitis, which was initially in my differential.  His Lipase is normal, making Pancreatitis unlikely.  He was prescribed zofran and instructed to return if his symptoms return or worsen. I rechecked his abdomen prior to discharge, and there was still no abdominal tenderness. His white blood cell count is normal so I did not feel there was concern for appendicitis or esophageal perforation. There was no sign of intestinal bleeding. I felt he could be safely discharged home. He was told to follow up with his primary care provider tomorrow.     Diagnosis:    ICD-10-CM    1. Non-intractable vomiting with nausea, unspecified vomiting type R11.2    2. Acute chest pain R07.9      Disposition:   The patient is discharged to home.    Discharge Medications:  Discharge Medication List as of 12/3/2018 12:46 PM      START taking these medications    Details   ondansetron (ZOFRAN ODT) 4 MG ODT tab Take 1 tablet (4 mg) by mouth every 4 hours as needed for nausea, Disp-10 tablet, R-0, Local Print           Scribe Disclosure:  I, Malorie Taylor, am serving as a scribe at 9:09 AM on 12/3/2018 to document services personally performed by Nikki Briggs MD based on my observations and the provider's statements to me.      EMERGENCY DEPARTMENT       Nikki Briggs MD  12/03/18 9104

## 2018-12-03 NOTE — ED NOTES
Bed: ED27  Expected date:   Expected time:   Means of arrival:   Comments:  Nini Chest pain and vomiting 48 male

## 2019-11-05 DIAGNOSIS — E11.65 TYPE 2 DIABETES MELLITUS WITH HYPERGLYCEMIA, WITHOUT LONG-TERM CURRENT USE OF INSULIN (H): ICD-10-CM

## 2019-11-05 NOTE — TELEPHONE ENCOUNTER
"METFORMIN 1000MG TABLETS  Last Written Prescription Date:  07/17/2018  Last Fill Quantity: 180,  # refills: 1   Last office visit: 6/4/2018 with prescribing provider:  06/04/2018   Future Office Visit:    Requested Prescriptions   Pending Prescriptions Disp Refills     metFORMIN (GLUCOPHAGE) 1000 MG tablet [Pharmacy Med Name: METFORMIN 1000MG TABLETS] 180 tablet 0     Sig: TAKE 1 TABLET(1000 MG) BY MOUTH TWICE DAILY WITH MEALS       Biguanide Agents Failed - 11/5/2019  3:57 PM        Failed - Blood pressure less than 140/90 in past 6 months     BP Readings from Last 3 Encounters:   12/03/18 115/76   06/04/18 118/74   05/07/18 128/70                 Failed - Patient has documented LDL within the past 12 mos.     Recent Labs   Lab Test 04/25/18  1612   *             Failed - Patient has had a Microalbumin in the past 15 mos.     Recent Labs   Lab Test 04/25/18  1611   MICROL 12   UMALCR 11.76             Failed - Patient has documented A1c within the specified period of time.     If HgbA1C is 8 or greater, it needs to be on file within the past 3 months.  If less than 8, must be on file within the past 6 months.     Recent Labs   Lab Test 07/21/18  1049   A1C 8.3*             Failed - Recent (6 mo) or future (30 days) visit within the authorizing provider's specialty     Patient had office visit in the last 6 months or has a visit in the next 30 days with authorizing provider or within the authorizing provider's specialty.  See \"Patient Info\" tab in inbasket, or \"Choose Columns\" in Meds & Orders section of the refill encounter.            Passed - Patient is age 10 or older        Passed - Patient's CR is NOT>1.4 OR Patient's EGFR is NOT<45 within past 12 mos.     Recent Labs   Lab Test 12/03/18  0849   GFRESTIMATED >90   GFRESTBLACK >90       Recent Labs   Lab Test 12/03/18  0849   CR 0.69             Passed - Patient does NOT have a diagnosis of CHF.        Passed - Medication is active on med list          "

## 2019-11-05 NOTE — TELEPHONE ENCOUNTER
Medication is being filled for 1 time refill only due to:  Patient needs to be seen because due for 6 month follow up.

## 2020-03-11 ENCOUNTER — HEALTH MAINTENANCE LETTER (OUTPATIENT)
Age: 50
End: 2020-03-11

## 2020-03-27 DIAGNOSIS — E11.65 TYPE 2 DIABETES MELLITUS WITH HYPERGLYCEMIA, WITHOUT LONG-TERM CURRENT USE OF INSULIN (H): ICD-10-CM

## 2020-03-27 RX ORDER — METFORMIN HCL 500 MG
500 TABLET, EXTENDED RELEASE 24 HR ORAL 2 TIMES DAILY WITH MEALS
Qty: 60 TABLET | Refills: 1 | Status: SHIPPED | OUTPATIENT
Start: 2020-03-27

## 2021-01-03 ENCOUNTER — HEALTH MAINTENANCE LETTER (OUTPATIENT)
Age: 51
End: 2021-01-03

## 2021-04-25 ENCOUNTER — HEALTH MAINTENANCE LETTER (OUTPATIENT)
Age: 51
End: 2021-04-25

## 2021-05-03 ENCOUNTER — DOCUMENTATION ONLY (OUTPATIENT)
Dept: INTERNAL MEDICINE | Facility: CLINIC | Age: 51
End: 2021-05-03

## 2021-05-03 DIAGNOSIS — E11.65 TYPE 2 DIABETES MELLITUS WITH HYPERGLYCEMIA, WITHOUT LONG-TERM CURRENT USE OF INSULIN (H): Primary | ICD-10-CM

## 2021-05-03 DIAGNOSIS — Z12.5 SCREENING FOR PROSTATE CANCER: ICD-10-CM

## 2021-05-03 NOTE — PROGRESS NOTES
Please place or confirm orders for upcoming lab appointment on 05/04/2021. Thank you.    Former patient of Dr. Cristina.

## 2021-05-05 DIAGNOSIS — Z12.5 SCREENING FOR PROSTATE CANCER: ICD-10-CM

## 2021-05-05 DIAGNOSIS — E11.65 TYPE 2 DIABETES MELLITUS WITH HYPERGLYCEMIA, WITHOUT LONG-TERM CURRENT USE OF INSULIN (H): ICD-10-CM

## 2021-05-05 LAB — HBA1C MFR BLD: 10.4 % (ref 0–5.6)

## 2021-05-05 PROCEDURE — G0103 PSA SCREENING: HCPCS | Performed by: INTERNAL MEDICINE

## 2021-05-05 PROCEDURE — 80061 LIPID PANEL: CPT | Performed by: INTERNAL MEDICINE

## 2021-05-05 PROCEDURE — 36415 COLL VENOUS BLD VENIPUNCTURE: CPT | Performed by: INTERNAL MEDICINE

## 2021-05-05 PROCEDURE — 83036 HEMOGLOBIN GLYCOSYLATED A1C: CPT | Performed by: INTERNAL MEDICINE

## 2021-05-05 PROCEDURE — 80053 COMPREHEN METABOLIC PANEL: CPT | Performed by: INTERNAL MEDICINE

## 2021-05-05 PROCEDURE — 82043 UR ALBUMIN QUANTITATIVE: CPT | Performed by: INTERNAL MEDICINE

## 2021-05-05 PROCEDURE — 84443 ASSAY THYROID STIM HORMONE: CPT | Performed by: INTERNAL MEDICINE

## 2021-05-06 LAB
ALBUMIN SERPL-MCNC: 3.5 G/DL (ref 3.4–5)
ALP SERPL-CCNC: 94 U/L (ref 40–150)
ALT SERPL W P-5'-P-CCNC: 33 U/L (ref 0–70)
ANION GAP SERPL CALCULATED.3IONS-SCNC: 5 MMOL/L (ref 3–14)
AST SERPL W P-5'-P-CCNC: 21 U/L (ref 0–45)
BILIRUB SERPL-MCNC: 0.3 MG/DL (ref 0.2–1.3)
BUN SERPL-MCNC: 12 MG/DL (ref 7–30)
CALCIUM SERPL-MCNC: 8.3 MG/DL (ref 8.5–10.1)
CHLORIDE SERPL-SCNC: 102 MMOL/L (ref 94–109)
CHOLEST SERPL-MCNC: 154 MG/DL
CO2 SERPL-SCNC: 27 MMOL/L (ref 20–32)
CREAT SERPL-MCNC: 0.82 MG/DL (ref 0.66–1.25)
CREAT UR-MCNC: 73 MG/DL
GFR SERPL CREATININE-BSD FRML MDRD: >90 ML/MIN/{1.73_M2}
GLUCOSE SERPL-MCNC: 256 MG/DL (ref 70–99)
HDLC SERPL-MCNC: 55 MG/DL
LDLC SERPL CALC-MCNC: 80 MG/DL
MICROALBUMIN UR-MCNC: 6 MG/L
MICROALBUMIN/CREAT UR: 8.68 MG/G CR (ref 0–17)
NONHDLC SERPL-MCNC: 99 MG/DL
POTASSIUM SERPL-SCNC: 3.9 MMOL/L (ref 3.4–5.3)
PROT SERPL-MCNC: 7.2 G/DL (ref 6.8–8.8)
PSA SERPL-ACNC: 0.15 UG/L (ref 0–4)
SODIUM SERPL-SCNC: 134 MMOL/L (ref 133–144)
TRIGL SERPL-MCNC: 96 MG/DL
TSH SERPL DL<=0.005 MIU/L-ACNC: 1.12 MU/L (ref 0.4–4)

## 2021-10-10 ENCOUNTER — HEALTH MAINTENANCE LETTER (OUTPATIENT)
Age: 51
End: 2021-10-10

## 2021-12-04 ENCOUNTER — HEALTH MAINTENANCE LETTER (OUTPATIENT)
Age: 51
End: 2021-12-04

## 2022-05-21 ENCOUNTER — HEALTH MAINTENANCE LETTER (OUTPATIENT)
Age: 52
End: 2022-05-21

## 2022-07-16 ENCOUNTER — HEALTH MAINTENANCE LETTER (OUTPATIENT)
Age: 52
End: 2022-07-16

## 2022-09-18 ENCOUNTER — HEALTH MAINTENANCE LETTER (OUTPATIENT)
Age: 52
End: 2022-09-18

## 2023-01-28 ENCOUNTER — HEALTH MAINTENANCE LETTER (OUTPATIENT)
Age: 53
End: 2023-01-28

## 2023-06-04 ENCOUNTER — HEALTH MAINTENANCE LETTER (OUTPATIENT)
Age: 53
End: 2023-06-04

## 2023-07-30 ENCOUNTER — HEALTH MAINTENANCE LETTER (OUTPATIENT)
Age: 53
End: 2023-07-30

## 2024-02-25 ENCOUNTER — HEALTH MAINTENANCE LETTER (OUTPATIENT)
Age: 54
End: 2024-02-25

## 2024-04-03 ENCOUNTER — APPOINTMENT (OUTPATIENT)
Dept: GENERAL RADIOLOGY | Facility: CLINIC | Age: 54
End: 2024-04-03
Attending: EMERGENCY MEDICINE
Payer: COMMERCIAL

## 2024-04-03 ENCOUNTER — APPOINTMENT (OUTPATIENT)
Dept: ULTRASOUND IMAGING | Facility: CLINIC | Age: 54
End: 2024-04-03
Attending: EMERGENCY MEDICINE
Payer: COMMERCIAL

## 2024-04-03 ENCOUNTER — HOSPITAL ENCOUNTER (EMERGENCY)
Facility: CLINIC | Age: 54
Discharge: HOME | End: 2024-04-03
Attending: EMERGENCY MEDICINE | Admitting: EMERGENCY MEDICINE
Payer: COMMERCIAL

## 2024-04-03 VITALS
RESPIRATION RATE: 11 BRPM | DIASTOLIC BLOOD PRESSURE: 92 MMHG | HEART RATE: 89 BPM | TEMPERATURE: 98.8 F | SYSTOLIC BLOOD PRESSURE: 138 MMHG | WEIGHT: 275 LBS | BODY MASS INDEX: 37.25 KG/M2 | HEIGHT: 72 IN | OXYGEN SATURATION: 98 %

## 2024-04-03 DIAGNOSIS — M79.672 LEFT FOOT PAIN: ICD-10-CM

## 2024-04-03 LAB
ANION GAP SERPL CALCULATED.3IONS-SCNC: 13 MMOL/L (ref 7–15)
BASOPHILS # BLD AUTO: 0 10E3/UL (ref 0–0.2)
BASOPHILS NFR BLD AUTO: 1 %
BUN SERPL-MCNC: 14.3 MG/DL (ref 6–20)
CALCIUM SERPL-MCNC: 9.1 MG/DL (ref 8.6–10)
CHLORIDE SERPL-SCNC: 97 MMOL/L (ref 98–107)
CREAT SERPL-MCNC: 0.76 MG/DL (ref 0.67–1.17)
CRP SERPL-MCNC: 5.79 MG/L
DEPRECATED HCO3 PLAS-SCNC: 26 MMOL/L (ref 22–29)
EGFRCR SERPLBLD CKD-EPI 2021: >90 ML/MIN/1.73M2
EOSINOPHIL # BLD AUTO: 0.1 10E3/UL (ref 0–0.7)
EOSINOPHIL NFR BLD AUTO: 1 %
ERYTHROCYTE [DISTWIDTH] IN BLOOD BY AUTOMATED COUNT: 14.1 % (ref 10–15)
ERYTHROCYTE [SEDIMENTATION RATE] IN BLOOD BY WESTERGREN METHOD: 10 MM/HR (ref 0–20)
GLUCOSE BLDC GLUCOMTR-MCNC: 279 MG/DL (ref 70–99)
GLUCOSE SERPL-MCNC: 279 MG/DL (ref 70–99)
HCT VFR BLD AUTO: 46.5 % (ref 40–53)
HGB BLD-MCNC: 14.9 G/DL (ref 13.3–17.7)
HOLD SPECIMEN: NORMAL
IMM GRANULOCYTES # BLD: 0 10E3/UL
IMM GRANULOCYTES NFR BLD: 0 %
LYMPHOCYTES # BLD AUTO: 2.2 10E3/UL (ref 0.8–5.3)
LYMPHOCYTES NFR BLD AUTO: 26 %
MCH RBC QN AUTO: 26.7 PG (ref 26.5–33)
MCHC RBC AUTO-ENTMCNC: 32 G/DL (ref 31.5–36.5)
MCV RBC AUTO: 83 FL (ref 78–100)
MONOCYTES # BLD AUTO: 0.6 10E3/UL (ref 0–1.3)
MONOCYTES NFR BLD AUTO: 8 %
NEUTROPHILS # BLD AUTO: 5.5 10E3/UL (ref 1.6–8.3)
NEUTROPHILS NFR BLD AUTO: 64 %
NRBC # BLD AUTO: 0 10E3/UL
NRBC BLD AUTO-RTO: 0 /100
PLATELET # BLD AUTO: 241 10E3/UL (ref 150–450)
POTASSIUM SERPL-SCNC: 4.2 MMOL/L (ref 3.4–5.3)
RBC # BLD AUTO: 5.58 10E6/UL (ref 4.4–5.9)
SODIUM SERPL-SCNC: 136 MMOL/L (ref 135–145)
WBC # BLD AUTO: 8.4 10E3/UL (ref 4–11)

## 2024-04-03 PROCEDURE — 250N000011 HC RX IP 250 OP 636: Performed by: EMERGENCY MEDICINE

## 2024-04-03 PROCEDURE — 80048 BASIC METABOLIC PNL TOTAL CA: CPT | Performed by: EMERGENCY MEDICINE

## 2024-04-03 PROCEDURE — 96374 THER/PROPH/DIAG INJ IV PUSH: CPT

## 2024-04-03 PROCEDURE — 36415 COLL VENOUS BLD VENIPUNCTURE: CPT | Performed by: EMERGENCY MEDICINE

## 2024-04-03 PROCEDURE — 99285 EMERGENCY DEPT VISIT HI MDM: CPT | Mod: 25

## 2024-04-03 PROCEDURE — 86140 C-REACTIVE PROTEIN: CPT | Performed by: EMERGENCY MEDICINE

## 2024-04-03 PROCEDURE — 73630 X-RAY EXAM OF FOOT: CPT | Mod: LT

## 2024-04-03 PROCEDURE — 93926 LOWER EXTREMITY STUDY: CPT | Mod: LT

## 2024-04-03 PROCEDURE — 73610 X-RAY EXAM OF ANKLE: CPT | Mod: LT

## 2024-04-03 PROCEDURE — 85652 RBC SED RATE AUTOMATED: CPT | Performed by: EMERGENCY MEDICINE

## 2024-04-03 PROCEDURE — 250N000013 HC RX MED GY IP 250 OP 250 PS 637: Performed by: EMERGENCY MEDICINE

## 2024-04-03 PROCEDURE — 85025 COMPLETE CBC W/AUTO DIFF WBC: CPT | Performed by: EMERGENCY MEDICINE

## 2024-04-03 PROCEDURE — 82962 GLUCOSE BLOOD TEST: CPT

## 2024-04-03 RX ORDER — TRAMADOL HYDROCHLORIDE 50 MG/1
50 TABLET ORAL EVERY 6 HOURS PRN
Qty: 12 TABLET | Refills: 0 | Status: SHIPPED | OUTPATIENT
Start: 2024-04-03 | End: 2024-04-06

## 2024-04-03 RX ORDER — IBUPROFEN 800 MG/1
800 TABLET, FILM COATED ORAL EVERY 8 HOURS PRN
Qty: 30 TABLET | Refills: 0 | Status: SHIPPED | OUTPATIENT
Start: 2024-04-03

## 2024-04-03 RX ORDER — OXYCODONE HYDROCHLORIDE 5 MG/1
5 TABLET ORAL ONCE
Status: DISCONTINUED | OUTPATIENT
Start: 2024-04-03 | End: 2024-04-03

## 2024-04-03 RX ORDER — OXYCODONE HYDROCHLORIDE 5 MG/1
10 TABLET ORAL ONCE
Status: COMPLETED | OUTPATIENT
Start: 2024-04-03 | End: 2024-04-03

## 2024-04-03 RX ORDER — METHYLPREDNISOLONE 4 MG
TABLET, DOSE PACK ORAL
Qty: 21 TABLET | Refills: 0 | Status: SHIPPED | OUTPATIENT
Start: 2024-04-03

## 2024-04-03 RX ORDER — ACETAMINOPHEN 500 MG
1000 TABLET ORAL ONCE
Status: COMPLETED | OUTPATIENT
Start: 2024-04-03 | End: 2024-04-03

## 2024-04-03 RX ORDER — KETOROLAC TROMETHAMINE 15 MG/ML
15 INJECTION, SOLUTION INTRAMUSCULAR; INTRAVENOUS ONCE
Status: COMPLETED | OUTPATIENT
Start: 2024-04-03 | End: 2024-04-03

## 2024-04-03 RX ADMIN — ACETAMINOPHEN 1000 MG: 500 TABLET, FILM COATED ORAL at 05:57

## 2024-04-03 RX ADMIN — OXYCODONE HYDROCHLORIDE 10 MG: 5 TABLET ORAL at 05:57

## 2024-04-03 RX ADMIN — KETOROLAC TROMETHAMINE 15 MG: 15 INJECTION, SOLUTION INTRAMUSCULAR; INTRAVENOUS at 06:35

## 2024-04-03 ASSESSMENT — ACTIVITIES OF DAILY LIVING (ADL)
ADLS_ACUITY_SCORE: 35

## 2024-04-03 ASSESSMENT — COLUMBIA-SUICIDE SEVERITY RATING SCALE - C-SSRS
1. IN THE PAST MONTH, HAVE YOU WISHED YOU WERE DEAD OR WISHED YOU COULD GO TO SLEEP AND NOT WAKE UP?: NO
6. HAVE YOU EVER DONE ANYTHING, STARTED TO DO ANYTHING, OR PREPARED TO DO ANYTHING TO END YOUR LIFE?: NO
2. HAVE YOU ACTUALLY HAD ANY THOUGHTS OF KILLING YOURSELF IN THE PAST MONTH?: NO

## 2024-04-03 NOTE — LETTER
April 3, 2024      To Whom It May Concern:      Jason COLLINS Moran was seen in our Emergency Department today, 04/03/24.  I expect his condition to improve over the next 3 days.  He may return to work/school when improved.    Sincerely,        AVA Benavidez

## 2024-04-03 NOTE — ED TRIAGE NOTES
Here for new onset left foot numbness and tenderness which has been gradual since he got off work on Monday. States top of left foot is tender and unable to stand on. Endorsing numbness in left foot, states feels different than right. Pt observed to be dragging foot while attempting to walk into ER. Rates pain 10/10. Denies trauma to area. Is type 2 DM. . Stroke eval called.

## 2024-04-03 NOTE — ED PROVIDER NOTES
History     Chief Complaint:  Numbness     The history is provided by the patient.      Jason Moran is a 53 year old male with a history of type 2 diabetes, neuropathy, and fatty liver who presents to the emergency department for L foot pain. The patient states that for 1 day, he has been experiencing left foot pain in which he is unable to weightbear. Denies pain radiation. He has not taken anything for this pain. Denies any trauma or injury to this foot. He states that intermittently, he also experiences numbness and tingling near his left knee. Denies any right-sided foot pain, numbness, tingling, or weakness. Denies fever or chills. Denies chest pain or shortness of breath. Denies abdominal pain or back pain. Denies headache or vision changes.    Independent Historian:   None - Patient Only    Review of External Notes:   See MDM     Medications:    Metformin  Ondansetron    Past Medical History:    Type 2 diabetes  Neuropathy  Obesity  Fatty liver    Past Surgical History:    L4 fusion  Discectomy    Physical Exam   Patient Vitals for the past 24 hrs:   BP Temp Pulse Resp SpO2 Height Weight   04/03/24 0645 132/70 -- 86 -- 97 % -- --   04/03/24 0600 (!) 147/81 -- 86 11 98 % -- --   04/03/24 0545 (!) 142/73 -- 93 22 100 % -- --   04/03/24 0530 (!) 147/81 -- 95 13 98 % -- --   04/03/24 0524 (!) 158/96 98.8  F (37.1  C) 99 16 100 % 1.829 m (6') 124.7 kg (275 lb)      Physical Exam  Constitutional: Well developed, nontox appearance  Head: Atraumatic.   Neck:  no stridor  Eyes: no scleral icterus  Cardiovascular: RRR, 2+ bilat DP pulses  Pulmonary/Chest: nml resp effort,  Ext: Warm, well perfused, no edema  LLE: Tenderness to lateral malleolus dorsal midfoot extending laterally to the head of fifth metatarsal without significant swelling, erythema, lacerations, crepitance or bony step-off/deformity  Neurological: A&O, symmetric facies, moves ext x4  Skin: Skin is warm and dry.  No rash  Psychiatric: Behavior is  normal. Thought content normal.   Nursing note and vitals reviewed.      Emergency Department Course     Imaging:  Foot XR, G/E 3 views, left   Final Result   IMPRESSION: Abnormally extensive atheromatous vascular calcification. Otherwise negative x-rays left foot and ankle.      XR Ankle Left G/E 3 Views   Final Result   IMPRESSION: Abnormally extensive atheromatous vascular calcification. Otherwise negative x-rays left foot and ankle.      US Lower Extremity Arterial Duplex Left    (Results Pending)      Read by radiologist.    Laboratory:  Labs Ordered and Resulted from Time of ED Arrival to Time of ED Departure   GLUCOSE BY METER - Abnormal       Result Value    GLUCOSE BY METER POCT 279 (*)    BASIC METABOLIC PANEL - Abnormal    Sodium 136      Potassium 4.2      Chloride 97 (*)     Carbon Dioxide (CO2) 26      Anion Gap 13      Urea Nitrogen 14.3      Creatinine 0.76      GFR Estimate >90      Calcium 9.1      Glucose 279 (*)    CRP INFLAMMATION - Abnormal    CRP Inflammation 5.79 (*)    ERYTHROCYTE SEDIMENTATION RATE AUTO - Normal    Erythrocyte Sedimentation Rate 10     CBC WITH PLATELETS AND DIFFERENTIAL    WBC Count 8.4      RBC Count 5.58      Hemoglobin 14.9      Hematocrit 46.5      MCV 83      MCH 26.7      MCHC 32.0      RDW 14.1      Platelet Count 241      % Neutrophils 64      % Lymphocytes 26      % Monocytes 8      % Eosinophils 1      % Basophils 1      % Immature Granulocytes 0      NRBCs per 100 WBC 0      Absolute Neutrophils 5.5      Absolute Lymphocytes 2.2      Absolute Monocytes 0.6      Absolute Eosinophils 0.1      Absolute Basophils 0.0      Absolute Immature Granulocytes 0.0      Absolute NRBCs 0.0        Emergency Department Course & Assessments:    Interventions:  Medications   oxyCODONE (ROXICODONE) tablet 10 mg (10 mg Oral $Given 4/3/24 0557)   acetaminophen (TYLENOL) tablet 1,000 mg (1,000 mg Oral $Given 4/3/24 0557)   ketorolac (TORADOL) injection 15 mg (15 mg Intravenous $Given  4/3/24 8635)      Assessments:  0525 I obtained history and examined the patient as noted above.     Independent Interpretation (X-rays, CTs, rhythm strip):  See MDM    Consultations/Discussion of Management or Tests:  None     Social Determinants of Health affecting care:   None    Disposition:  Care of the patient was transferred to my colleague Dr. Win pending duplex arterial study.     Impression & Plan    CMS Diagnoses: none    Medical Decision Makin year old male presenting w/ left foot pain    Social determinants affecting patient's health include: No significant social determinants negatively affecting the patient's health     I reviewed medical records from internal medicine office visit on 8/3/2023    DDx includes fracture, contusion, sprain, tendinitis, dislocation, extremity pain NOS.  Doubt septic arthritis given history and physical exam.  Less likely vascular etiology given 2+ palpable DP pulses on exam.  Gout also seems less likely given distribution of pain and tenderness seemingly not limited to ankle joint or toes.  No evidence of compartment syndrome on exam.  CMS is intact distally in the extremity.   No other concern for significant trauma other than the areas imaged as above based on history and physical exam.  Imaging sig for no acute fracture or dislocation on my independent interpretation with radiology read as noted above.  Interventions as noted above with mild improvement in symptoms.  Given atherosclerosis noted on x-rays, duplex arterial ultrasound ordered for further evaluation.  Should the ultrasound return unremarkable, I feel the patient is safe for discharge with plan for symptomatic relief with prescriptions provided as noted below and follow-up with podiatry for reevaluation.  Patient was counseled on the potential plan and he is agreeable.  Crutches and Ortho shoe ordered prior to signout.  The patient subsequently signed out in stable condition awaiting ultrasound  results.      Diagnosis:    ICD-10-CM    1. Left foot pain  M79.672 Orthopedic  Referral           Discharge Medications:  New Prescriptions    IBUPROFEN (ADVIL/MOTRIN) 800 MG TABLET    Take 1 tablet (800 mg) by mouth every 8 hours as needed    METHYLPREDNISOLONE (MEDROL DOSEPAK) 4 MG TABLET THERAPY PACK    Follow Package Directions    TRAMADOL (ULTRAM) 50 MG TABLET    Take 1 tablet (50 mg) by mouth every 6 hours as needed for severe pain      Scribe Disclosure:  Hill GUERRA, am serving as a scribe at 5:26 AM on 4/3/2024 to document services personally performed by Bro Rodney MD   based on my observations and the provider's statements to me.     4/3/2024          Bro Rodney MD  04/03/24 0745

## 2024-04-03 NOTE — ED PROVIDER NOTES
This 53 year old male patient with type 2 diabetes and neuropathy was endorsed to me by Dr. Rodney pending arterial duplex of the left lower extremity for abnormally extensive atheromatous vascular calcifications noted on x-rays after he presented with left dorsal lateral foot pain and inability to weight-bear.  He has received Tylenol, oxycodone, and Toradol.    I have reviewed patient's vitals, imaging, labs, and notes which are remarkable for hyperglycemia and minimal elevation in CRP at 5.79.    0857 I updated the patient on reassuring US and plan for discharge.  I reiterated the need to follow-up with podiatry.  He understands plan for steroids, tramadol, and crutches.  He will be provided a work note.  I encouraged him to return with worsening symptoms or new concerns.    US Lower Extremity Arterial Duplex Left   Preliminary Result   IMPRESSION: Patent arteries throughout the left lower extremity.              Haydee Win MD  04/03/24 7283

## 2024-04-03 NOTE — DISCHARGE INSTRUCTIONS
1. -Take acetaminophen 500 to 1000 mg by mouth every 4 to 6 hours as needed for pain or fever.  Do not take more than 4000 mg in 24 hours.  Do not take within 6 hours of another acetaminophen containing medication such as norco (vicodin) or percocet.  - Take ibuprofen 600 to 800 mg by mouth every 6 to 8 hours as needed for pain or fever  2. Use ice as needed for pain and swelling.  3. Elevate extremity to help with swelling.  4. Follow-up with your primary doctor for recheck and in podiatry clinic.  5. You may return to the ED as needed for new or worsening symptoms such as reinjury, severe and uncontrollable pain, focal weakness, severe numbness and tingling, any other concerning symptoms.

## 2024-04-09 ENCOUNTER — OFFICE VISIT (OUTPATIENT)
Dept: PODIATRY | Facility: CLINIC | Age: 54
End: 2024-04-09
Payer: COMMERCIAL

## 2024-04-09 VITALS — OXYGEN SATURATION: 98 % | BODY MASS INDEX: 37.25 KG/M2 | WEIGHT: 275 LBS | HEIGHT: 72 IN | HEART RATE: 73 BPM

## 2024-04-09 DIAGNOSIS — M10.9 ACUTE GOUT OF LEFT FOOT, UNSPECIFIED CAUSE: Primary | ICD-10-CM

## 2024-04-09 PROCEDURE — 99203 OFFICE O/P NEW LOW 30 MIN: CPT | Performed by: PODIATRIST

## 2024-04-09 ASSESSMENT — PAIN SCALES - GENERAL: PAINLEVEL: MILD PAIN (3)

## 2024-04-09 NOTE — PATIENT INSTRUCTIONS
Podiatry Clinics that offer foot and toenail care  [facility]  [place] and [place]  [phone]    [facility] Clin[facility], [facility]  [phone]    [facility]  [place] - Dr. Trevizo on Tuesdays  [phone]    [facility]  [place]  [phone]    [facility]  [facility], [place] and [place]  [phone]    [facility]  [phone]    [facility]  [phone]      [facility]  *Nurse comes to your home for nail care.  Zoe Iglesias RN Foot Specialist  [phone]    GOUT  Gout is a disorder that results from the build-up of uric acid in the tissues or a joint. It most often affects the joint of the big toe.  CAUSES  Gout attacks are caused by deposits of crystallized uric acid in the joint. Uric acid is present in the blood and eliminated in the urine, but in people who have gout, uric acid accumulates and crystallizes in the joints. Uric acid is the result of the breakdown of purines, chemicals that are found naturally in our bodies and in food. Some people develop gout because their kidneys have difficulty eliminating normal amounts of uric acid, while others produce too much uric acid.  Gout occurs most commonly in the big toe because uric acid is sensitive to temperature changes. At cooler temperatures, uric acid turns into crystals. Since the toe is the part of the body that is farthest from the heart, it s also the coolest part of the body - and, thus, the most likely target of gout. However, gout can affect any joint in the body.  The tendency to accumulate uric acid is often inherited. Other factors that put a person at risk for developing gout include: high blood pressure, diabetes, obesity, surgery, chemotherapy, stress, and certain medications and vitamins. For example, the body s ability to remove uric acid can be negatively affected by taking aspirin,  some diuretic medications ( water pills ), and the vitamin niacin (also called nicotinic acid). While gout is more common in men aged 40 to 60 years, it can occur in younger men as well as in women.  Consuming foods and beverages that contain high levels of purines can trigger an attack of gout. Some foods contain more purines than others and have been associated with an increase of uric acid, which leads to gout. You may be able to reduce your chances of getting a gout attack by limiting or avoiding shellfish, organ meats (kidney, liver, etc.), red wine, beer, and red meat.  Other Triggers include but are not limited to:  Congestive heart failure, deep vein thrombosis, pneumonia, fasting, dehydration, trauma/surgery, psoriasis.  SYMTOMS  An attack of gout can be miserable, marked by the following symptoms:  Intense pain that comes on suddenly - often in the middle of the night or upon arising   Signs of inflammation such as redness, swelling, and warmth over the joint.   DIAGNOSIS  To diagnose gout, the foot and ankle surgeon will ask questions about your personal and family medical history, followed by an examination of the affected joint. Laboratory tests and x-rays are sometimes ordered to determine if the inflammation is caused by something other than gout.  TREATMENT  Initial treatment of an attack of gout typically includes the following:  Medications. Prescription medications or injections are used to treat the pain, swelling, and inflammation.   Dietary restrictions. Foods and beverages that are high in purines should be avoided, since purines are converted in the body to uric acid.   Fluids. Drink plenty of water and other fluids each day, while also avoiding alcoholic beverages, which cause dehydration. Cherry Juice works well to help decrease pain.  Immobilize and elevate the foot. Avoid standing and walking to give your foot a rest. Also, elevate your foot (level with or slightly above the heart) to  help reduce swelling.   The symptoms of gout and the inflammatory process usually resolve in three to ten days with treatment. If gout symptoms continue despite the initial treatment, or if repeated attacks occur, see your primary care physician for maintenance treatment that may involve daily medication. In cases of repeated episodes, the underlying problem must be addressed, as the build-up of uric acid over time can cause arthritic damage to the joint.  DIET CHANGE  A gout diet reduces your intake of foods that are high in purines, which helps control your body's production of uric acid. If you're overweight or obese, lose weight. However, avoid fasting and rapid weight loss because these can promote a gout attack. Drink plenty of fluids to help flush uric acid from your body. Also avoid high-protein diets, which can cause you to produce too much uric acid (hyperuricemia).   To follow the diet:   Limit meat, poultry and fish. Animal proteins are high in purine. Avoid or severely limit high-purine foods, such as organ meats, herring, anchovies and mackerel. Red meat (beef, pork and lamb), fatty fish and seafood (tuna, shrimp, lobster and scallops) are associated with increased risk of gout. Because all meat, poultry and fish contain purines, limit your intake to 4 to 6 ounces (113 to 170 grams) daily.   Eat more plant-based proteins. You can increase your protein by including more plant-based sources, such as beans and legumes. This switch will also help you cut down on saturated fats, which may indirectly contribute to obesity and gout.   Limit or avoid alcohol. Alcohol interferes with the elimination of uric acid from your body. Drinking beer, in particular, has been linked to gout attacks. If you're having an attack, avoid alcohol. However, when you're not having an attack, drinking one or two 5-ounce (148 milliliter) servings a day of wine is not likely to increase your risk.   Drink plenty of fluids,  particularly water. Fluids can help remove uric acid from your body. Aim for eight to 16 8-ounce (237 milliliter) glasses a day.   Choose low-fat or fat-free dairy products. Some studies have shown that drinking skim or low-fat milk and eating foods made with them, such as yogurt, help reduce the risk of gout. Aim for adequate dairy intake of 16 to 24 fluid ounces (473 to 710 milliliters) daily.   Choose complex carbohydrates. Eat more whole grains and fruits and vegetables and fewer refined carbohydrates, such as white bread, cakes and candy.   Limit or avoid sugar. Too many sweets can leave you with no room for plant-based proteins and low-fat or fat-free dairy products -- the foods you need to avoid gout. Sugary foods also tend to be high in calories, so they make it easier to eat more than you're likely to burn off. Although there's debate about whether sugar has a direct effect on uric acid levels, sweets are definitely linked to overweight and obesity.   There's also some evidence that drinking four to six cups of coffee a day lowers gout risk in men.   RESULTS  Following a gout diet can help you limit your body's uric acid production and increase its elimination. It's not likely to lower the uric acid concentration in your blood enough to treat your gout without medication, but it may help decrease the number of attacks and limit their severity. Following the gout diet and limiting your calories -- particularly if you also add in moderate daily exercise, such as brisk walking -- also can improve your overall health by helping you achieve and maintain a healthy weight.

## 2024-04-09 NOTE — Clinical Note
2024         RE: Jason Moran  [address] [place] [address]        Dear Colleague,    Thank you for referring your patient, Jason Moran, to the [facility]. Please see a copy of my visit note below.    FOOT AND ANKLE SURGERY/PODIATRY CONSULT NOTE         ASSESSMENT:   ***      TREATMENT:  ***        HPI: I was asked to see Jason Moran today  ***.  The patient was seen in consultation at the request of Bro Lui MD for evaluation and treatment of left foot pain.       Past Medical History:   Diagnosis Date     Diabetes (H)     type II     Neuropathy      Obese        Social History     Socioeconomic History     Marital status:      Spouse name: Not on file     Number of children: Not on file     Years of education: Not on file     Highest education level: Not on file   Occupational History     Not on file   Tobacco Use     Smoking status: Former     Types: Cigarettes     Quit date: 2006     Years since quittin.2     Smokeless tobacco: Never   Substance and Sexual Activity     Alcohol use: Yes     Comment: 1-2 glasses per week, minimal     Drug use: No     Sexual activity: Yes     Partners: Female   Other Topics Concern     Parent/sibling w/ CABG, MI or angioplasty before 65F 55M? Not Asked   Social History Narrative     Not on file     Social Determinants of Health     Financial Resource Strain: Not on file   Food Insecurity: Not on file   Transportation Needs: Not on file   Physical Activity: Not on file   Stress: Not on file   Social Connections: Not on file   Interpersonal Safety: Not on file   Housing Stability: Not on file        No Known Allergies       Current Outpatient Medications:      ibuprofen (ADVIL/MOTRIN) 800 MG tablet, Take 1 tablet (800 mg) by mouth every 8 hours as needed, Disp: 30 tablet, Rfl: 0     metFORMIN (GLUCOPHAGE) 1000 MG tablet, TAKE 1 TABLET BY MOUTH TWICE DAILY WITH MEALS, Disp: 60 tablet, Rfl: 0     metFORMIN  (GLUCOPHAGE-XR) 500 MG 24 hr tablet, Take 1 tablet (500 mg) by mouth 2 times daily (with meals), Disp: 60 tablet, Rfl: 1     methylPREDNISolone (MEDROL DOSEPAK) 4 MG tablet therapy pack, Follow Package Directions, Disp: 21 tablet, Rfl: 0     ondansetron (ZOFRAN ODT) 4 MG ODT tab, Take 1 tablet (4 mg) by mouth every 4 hours as needed for nausea, Disp: 10 tablet, Rfl: 0     History reviewed. No pertinent family history.     Social History     Socioeconomic History     Marital status:      Spouse name: Not on file     Number of children: Not on file     Years of education: Not on file     Highest education level: Not on file   Occupational History     Not on file   Tobacco Use     Smoking status: Former     Types: Cigarettes     Quit date: 2006     Years since quittin.2     Smokeless tobacco: Never   Substance and Sexual Activity     Alcohol use: Yes     Comment: 1-2 glasses per week, minimal     Drug use: No     Sexual activity: Yes     Partners: Female   Other Topics Concern     Parent/sibling w/ CABG, MI or angioplasty before 65F 55M? Not Asked   Social History Narrative     Not on file     Social Determinants of Health     Financial Resource Strain: Not on file   Food Insecurity: Not on file   Transportation Needs: Not on file   Physical Activity: Not on file   Stress: Not on file   Social Connections: Not on file   Interpersonal Safety: Not on file   Housing Stability: Not on file        Review of Systems - Patient denies fever, chills, rash, wound, stiffness, numbness, weakness, heart burn, blood in stool, chest pain with activity, calf pain when walking, shortness of breath with activity, chronic cough, easy bleeding/bruising, swelling of ankles, excessive thirst, fatigue, depression, anxiety.  Patient admits to left foot pain.      OBJECTIVE:  Appearance: alert, well appearing, and in no distress.    Pulse 73   Ht 1.829 m (6')   Wt 124.7 kg (275 lb)   SpO2 98%   BMI 37.30 kg/m       Body  mass index is 37.3 kg/m .     General appearance: Patient is alert and fully cooperative with history & exam.  No sign of distress is noted during the visit.  Psychiatric: Affect is pleasant & appropriate.  Patient appears motivated to improve health.  Respiratory: Breathing is regular & unlabored while sitting.  HEENT: Hearing is intact to spoken word.  Speech is clear.  No gross evidence of visual impairment that would impact ambulation.    Vascular: Dorsalis pedis and posterior tibial pulses are palpable. There is pedal hair growth ***.  CFT < 3 sec from anterior tibial surface to distal digits ***. There is no appreciable edema noted.  Dermatologic: Turgor and texture are within normal limits. No coloration or temperature changes. No primary or secondary lesions noted.  Neurologic: All epicritic and proprioceptive sensations are grossly intact ***.  Musculoskeletal: All active and passive ankle, subtalar, midtarsal, and 1st MPJ range of motion are grossly intact without pain or crepitus, with the exception of ***. Manual muscle strength is ***. All dorsiflexors, plantarflexors, invertors, evertors are intact ***. Tenderness present to *** on palpation. Tenderness to *** with range of motion. Calf is soft/non-tender without warmth/induration    Imaging:       No images are attached to the encounter or orders placed in the encounter.     US Lower Extremity Arterial Duplex Left    Result Date: 4/3/2024  ULTRASOUND LEFT LOWER EXTREMITY ARTERIAL DUPLEX   4/3/2024 7:58 AM HISTORY: Severe foot pain. COMPARISON: None. FINDINGS: Color Doppler and spectral waveform analysis was performed throughout the arteries of the right lower extremity. Right common femoral, profunda femoral, superficial femoral, popliteal, anterior tibial, posterior tibial, peroneal and dorsalis pedis arteries are patent without evidence of stenosis or occlusion.     IMPRESSION: Patent arteries throughout the left lower extremity. AGATHA GAUTHIER  DO   SYSTEM ID:  [id number]    Foot XR, G/E 3 views, left    Result Date: 4/3/2024  X-RAY LEFT ANKLE AND X-RAY LEFT FOOT HISTORY: Progressive foot and ankle pain and numbness for over 24 hours. No trauma. TECHNIQUE: 3 x-rays of the left ankle in 3 x-rays of the left foot are provided. Correlation with emergency department triage note 5:19 AM this morning. FINDINGS: Abnormally extensive atheromatous vascular calcification. Bones are intact and joints appear normal. Normal alignment mid foot. No posttraumatic deformity or bone lesion.     IMPRESSION: Abnormally extensive atheromatous vascular calcification. Otherwise negative x-rays left foot and ankle.    XR Ankle Left G/E 3 Views    Result Date: 4/3/2024  X-RAY LEFT ANKLE AND X-RAY LEFT FOOT HISTORY: Progressive foot and ankle pain and numbness for over 24 hours. No trauma. TECHNIQUE: 3 x-rays of the left ankle in 3 x-rays of the left foot are provided. Correlation with emergency department triage note 5:19 AM this morning. FINDINGS: Abnormally extensive atheromatous vascular calcification. Bones are intact and joints appear normal. Normal alignment mid foot. No posttraumatic deformity or bone lesion.     IMPRESSION: Abnormally extensive atheromatous vascular calcification. Otherwise negative x-rays left foot and ankle.     US Lower Extremity Arterial Duplex Left    Result Date: 4/3/2024  ULTRASOUND LEFT LOWER EXTREMITY ARTERIAL DUPLEX   4/3/2024 7:58 AM HISTORY: Severe foot pain. COMPARISON: None. FINDINGS: Color Doppler and spectral waveform analysis was performed throughout the arteries of the right lower extremity. Right common femoral, profunda femoral, superficial femoral, popliteal, anterior tibial, posterior tibial, peroneal and dorsalis pedis arteries are patent without evidence of stenosis or occlusion.     IMPRESSION: Patent arteries throughout the left lower extremity. AGATHA GAUTHIER DO   SYSTEM ID:  [id number]    Foot XR, G/E 3 views, left    Result  Date: 4/3/2024  X-RAY LEFT ANKLE AND X-RAY LEFT FOOT HISTORY: Progressive foot and ankle pain and numbness for over 24 hours. No trauma. TECHNIQUE: 3 x-rays of the left ankle in 3 x-rays of the left foot are provided. Correlation with emergency department triage note 5:19 AM this morning. FINDINGS: Abnormally extensive atheromatous vascular calcification. Bones are intact and joints appear normal. Normal alignment mid foot. No posttraumatic deformity or bone lesion.     IMPRESSION: Abnormally extensive atheromatous vascular calcification. Otherwise negative x-rays left foot and ankle.    XR Ankle Left G/E 3 Views    Result Date: 4/3/2024  X-RAY LEFT ANKLE AND X-RAY LEFT FOOT HISTORY: Progressive foot and ankle pain and numbness for over 24 hours. No trauma. TECHNIQUE: 3 x-rays of the left ankle in 3 x-rays of the left foot are provided. Correlation with emergency department triage note 5:19 AM this morning. FINDINGS: Abnormally extensive atheromatous vascular calcification. Bones are intact and joints appear normal. Normal alignment mid foot. No posttraumatic deformity or bone lesion.     IMPRESSION: Abnormally extensive atheromatous vascular calcification. Otherwise negative x-rays left foot and ankle.           Dayron Hassan DPM  [facility] Foot & Ankle Surgery/Podiatry         Again, thank you for allowing me to participate in the care of your patient.        Sincerely,        Dayron Santos DPM

## 2024-04-09 NOTE — PROGRESS NOTES
FOOT AND ANKLE SURGERY/PODIATRY CONSULT NOTE         ASSESSMENT:   Acute gout left foot      TREATMENT:  Informed patient that his gouty episode appears to be resolving.  No additional treatment is necessary.  If his symptoms recur I recommend he return to the clinic for follow-up visit.          HPI: I was asked to see Jason Moran today to evaluate and treat left foot pain.  The patient stated that several years ago he had a severe, intense, excruciating pain on the top of his left foot.  The pain had a very sudden onset.  It started early in the morning.  He was unable to walk.  He had a difficult time wearing his shoes because of the pain.  He does not recall any trauma to his left foot.  He has never had pain with his left foot in the past.  He also had some swelling associated with this pain.  He was seen by his primary care physician.  He was placed on a Medrol Dosepak.  His pain did improve.  He stated that his pain has markedly improved as of this date.  The patient was seen in consultation at the request of Bro Lui MD for evaluation and treatment of left foot pain.       Past Medical History:   Diagnosis Date    Diabetes (H)     type II    Neuropathy     Obese        Social History     Socioeconomic History    Marital status:      Spouse name: Not on file    Number of children: Not on file    Years of education: Not on file    Highest education level: Not on file   Occupational History    Not on file   Tobacco Use    Smoking status: Former     Types: Cigarettes     Quit date: 2006     Years since quittin.2    Smokeless tobacco: Never   Substance and Sexual Activity    Alcohol use: Yes     Comment: 1-2 glasses per week, minimal    Drug use: No    Sexual activity: Yes     Partners: Female   Other Topics Concern    Parent/sibling w/ CABG, MI or angioplasty before 65F 55M? Not Asked   Social History Narrative    Not on file     Social Determinants of Health     Financial  Resource Strain: Not on file   Food Insecurity: Not on file   Transportation Needs: Not on file   Physical Activity: Not on file   Stress: Not on file   Social Connections: Not on file   Interpersonal Safety: Not on file   Housing Stability: Not on file        No Known Allergies       Current Outpatient Medications:     ibuprofen (ADVIL/MOTRIN) 800 MG tablet, Take 1 tablet (800 mg) by mouth every 8 hours as needed, Disp: 30 tablet, Rfl: 0    metFORMIN (GLUCOPHAGE) 1000 MG tablet, TAKE 1 TABLET BY MOUTH TWICE DAILY WITH MEALS, Disp: 60 tablet, Rfl: 0    metFORMIN (GLUCOPHAGE-XR) 500 MG 24 hr tablet, Take 1 tablet (500 mg) by mouth 2 times daily (with meals), Disp: 60 tablet, Rfl: 1    methylPREDNISolone (MEDROL DOSEPAK) 4 MG tablet therapy pack, Follow Package Directions, Disp: 21 tablet, Rfl: 0    ondansetron (ZOFRAN ODT) 4 MG ODT tab, Take 1 tablet (4 mg) by mouth every 4 hours as needed for nausea, Disp: 10 tablet, Rfl: 0     History reviewed. No pertinent family history.     Social History     Socioeconomic History    Marital status:      Spouse name: Not on file    Number of children: Not on file    Years of education: Not on file    Highest education level: Not on file   Occupational History    Not on file   Tobacco Use    Smoking status: Former     Types: Cigarettes     Quit date: 2006     Years since quittin.2    Smokeless tobacco: Never   Substance and Sexual Activity    Alcohol use: Yes     Comment: 1-2 glasses per week, minimal    Drug use: No    Sexual activity: Yes     Partners: Female   Other Topics Concern    Parent/sibling w/ CABG, MI or angioplasty before 65F 55M? Not Asked   Social History Narrative    Not on file     Social Determinants of Health     Financial Resource Strain: Not on file   Food Insecurity: Not on file   Transportation Needs: Not on file   Physical Activity: Not on file   Stress: Not on file   Social Connections: Not on file   Interpersonal Safety: Not on file    Housing Stability: Not on file        Review of Systems - Patient denies fever, chills, rash, wound, stiffness, numbness, weakness, heart burn, blood in stool, chest pain with activity, calf pain when walking, shortness of breath with activity, chronic cough, easy bleeding/bruising, swelling of ankles, excessive thirst, fatigue, depression, anxiety.  Patient admits to left foot pain.      OBJECTIVE:  Appearance: alert, well appearing, and in no distress.    Pulse 73   Ht 1.829 m (6')   Wt 124.7 kg (275 lb)   SpO2 98%   BMI 37.30 kg/m       Body mass index is 37.3 kg/m .     General appearance: Patient is alert and fully cooperative with history & exam.  No sign of distress is noted during the visit.  Psychiatric: Affect is pleasant & appropriate.  Patient appears motivated to improve health.  Respiratory: Breathing is regular & unlabored while sitting.  HEENT: Hearing is intact to spoken word.  Speech is clear.  No gross evidence of visual impairment that would impact ambulation.    Vascular: Dorsalis pedis and posterior tibial pulses are palpable. There is no pedal hair growth bilaterally.  CFT < 3 sec from anterior tibial surface to distal digits bilaterally. There is no appreciable edema noted.  Dermatologic: Turgor and texture are within normal limits. No coloration or temperature changes. No primary or secondary lesions noted.  Neurologic: All epicritic and proprioceptive sensations are grossly intact bilaterally.  Musculoskeletal: All active and passive ankle, subtalar, midtarsal, and 1st MPJ range of motion are grossly intact without pain or crepitus, with the exception of none. Manual muscle strength is within normal limits bilaterally. All dorsiflexors, plantarflexors, invertors, evertors are intact bilaterally.  Minimal tenderness present to the left foot on palpation.  Minimal tenderness to the left foot with range of motion. Calf is soft/non-tender without warmth/induration    Imaging:       No  images are attached to the encounter or orders placed in the encounter.     US Lower Extremity Arterial Duplex Left    Result Date: 4/3/2024  ULTRASOUND LEFT LOWER EXTREMITY ARTERIAL DUPLEX   4/3/2024 7:58 AM HISTORY: Severe foot pain. COMPARISON: None. FINDINGS: Color Doppler and spectral waveform analysis was performed throughout the arteries of the right lower extremity. Right common femoral, profunda femoral, superficial femoral, popliteal, anterior tibial, posterior tibial, peroneal and dorsalis pedis arteries are patent without evidence of stenosis or occlusion.     IMPRESSION: Patent arteries throughout the left lower extremity. AGATHA GAUTHIER DO   SYSTEM ID:  [id number]    Foot XR, G/E 3 views, left    Result Date: 4/3/2024  X-RAY LEFT ANKLE AND X-RAY LEFT FOOT HISTORY: Progressive foot and ankle pain and numbness for over 24 hours. No trauma. TECHNIQUE: 3 x-rays of the left ankle in 3 x-rays of the left foot are provided. Correlation with emergency department triage note 5:19 AM this morning. FINDINGS: Abnormally extensive atheromatous vascular calcification. Bones are intact and joints appear normal. Normal alignment mid foot. No posttraumatic deformity or bone lesion.     IMPRESSION: Abnormally extensive atheromatous vascular calcification. Otherwise negative x-rays left foot and ankle.    XR Ankle Left G/E 3 Views    Result Date: 4/3/2024  X-RAY LEFT ANKLE AND X-RAY LEFT FOOT HISTORY: Progressive foot and ankle pain and numbness for over 24 hours. No trauma. TECHNIQUE: 3 x-rays of the left ankle in 3 x-rays of the left foot are provided. Correlation with emergency department triage note 5:19 AM this morning. FINDINGS: Abnormally extensive atheromatous vascular calcification. Bones are intact and joints appear normal. Normal alignment mid foot. No posttraumatic deformity or bone lesion.     IMPRESSION: Abnormally extensive atheromatous vascular calcification. Otherwise negative x-rays left foot and  ankle.     US Lower Extremity Arterial Duplex Left    Result Date: 4/3/2024  ULTRASOUND LEFT LOWER EXTREMITY ARTERIAL DUPLEX   4/3/2024 7:58 AM HISTORY: Severe foot pain. COMPARISON: None. FINDINGS: Color Doppler and spectral waveform analysis was performed throughout the arteries of the right lower extremity. Right common femoral, profunda femoral, superficial femoral, popliteal, anterior tibial, posterior tibial, peroneal and dorsalis pedis arteries are patent without evidence of stenosis or occlusion.     IMPRESSION: Patent arteries throughout the left lower extremity. AGATHA GAUTHIER DO   SYSTEM ID:  [id number]    Foot XR, G/E 3 views, left    Result Date: 4/3/2024  X-RAY LEFT ANKLE AND X-RAY LEFT FOOT HISTORY: Progressive foot and ankle pain and numbness for over 24 hours. No trauma. TECHNIQUE: 3 x-rays of the left ankle in 3 x-rays of the left foot are provided. Correlation with emergency department triage note 5:19 AM this morning. FINDINGS: Abnormally extensive atheromatous vascular calcification. Bones are intact and joints appear normal. Normal alignment mid foot. No posttraumatic deformity or bone lesion.     IMPRESSION: Abnormally extensive atheromatous vascular calcification. Otherwise negative x-rays left foot and ankle.    XR Ankle Left G/E 3 Views    Result Date: 4/3/2024  X-RAY LEFT ANKLE AND X-RAY LEFT FOOT HISTORY: Progressive foot and ankle pain and numbness for over 24 hours. No trauma. TECHNIQUE: 3 x-rays of the left ankle in 3 x-rays of the left foot are provided. Correlation with emergency department triage note 5:19 AM this morning. FINDINGS: Abnormally extensive atheromatous vascular calcification. Bones are intact and joints appear normal. Normal alignment mid foot. No posttraumatic deformity or bone lesion.     IMPRESSION: Abnormally extensive atheromatous vascular calcification. Otherwise negative x-rays left foot and ankle.           Dayron Hassan DPM  [facility] Foot & Ankle  Surgery/Podiatry

## 2024-05-30 ENCOUNTER — HOSPITAL ENCOUNTER (EMERGENCY)
Facility: CLINIC | Age: 54
Discharge: HOME | End: 2024-05-30
Attending: STUDENT IN AN ORGANIZED HEALTH CARE EDUCATION/TRAINING PROGRAM | Admitting: STUDENT IN AN ORGANIZED HEALTH CARE EDUCATION/TRAINING PROGRAM
Payer: COMMERCIAL

## 2024-05-30 VITALS
HEIGHT: 72 IN | SYSTOLIC BLOOD PRESSURE: 151 MMHG | DIASTOLIC BLOOD PRESSURE: 94 MMHG | RESPIRATION RATE: 17 BRPM | HEART RATE: 89 BPM | TEMPERATURE: 97 F | OXYGEN SATURATION: 98 % | BODY MASS INDEX: 38.79 KG/M2 | WEIGHT: 286.38 LBS

## 2024-05-30 DIAGNOSIS — R19.5 LOOSE STOOLS: ICD-10-CM

## 2024-05-30 DIAGNOSIS — R21 RASH AND NONSPECIFIC SKIN ERUPTION: ICD-10-CM

## 2024-05-30 LAB
ANION GAP SERPL CALCULATED.3IONS-SCNC: 12 MMOL/L (ref 7–15)
BASOPHILS # BLD AUTO: 0 10E3/UL (ref 0–0.2)
BASOPHILS NFR BLD AUTO: 1 %
BUN SERPL-MCNC: 12 MG/DL (ref 6–20)
CALCIUM SERPL-MCNC: 8.8 MG/DL (ref 8.6–10)
CHLORIDE SERPL-SCNC: 100 MMOL/L (ref 98–107)
CREAT SERPL-MCNC: 0.83 MG/DL (ref 0.67–1.17)
DEPRECATED HCO3 PLAS-SCNC: 25 MMOL/L (ref 22–29)
EGFRCR SERPLBLD CKD-EPI 2021: >90 ML/MIN/1.73M2
EOSINOPHIL # BLD AUTO: 0.1 10E3/UL (ref 0–0.7)
EOSINOPHIL NFR BLD AUTO: 2 %
ERYTHROCYTE [DISTWIDTH] IN BLOOD BY AUTOMATED COUNT: 14.2 % (ref 10–15)
FLUAV RNA SPEC QL NAA+PROBE: NEGATIVE
FLUBV RNA RESP QL NAA+PROBE: NEGATIVE
GLUCOSE SERPL-MCNC: 187 MG/DL (ref 70–99)
GROUP A STREP BY PCR: NOT DETECTED
HCT VFR BLD AUTO: 44 % (ref 40–53)
HGB BLD-MCNC: 13.9 G/DL (ref 13.3–17.7)
IMM GRANULOCYTES # BLD: 0 10E3/UL
IMM GRANULOCYTES NFR BLD: 0 %
LYMPHOCYTES # BLD AUTO: 1.9 10E3/UL (ref 0.8–5.3)
LYMPHOCYTES NFR BLD AUTO: 34 %
MCH RBC QN AUTO: 26.4 PG (ref 26.5–33)
MCHC RBC AUTO-ENTMCNC: 31.6 G/DL (ref 31.5–36.5)
MCV RBC AUTO: 84 FL (ref 78–100)
MONOCYTES # BLD AUTO: 0.4 10E3/UL (ref 0–1.3)
MONOCYTES NFR BLD AUTO: 7 %
NEUTROPHILS # BLD AUTO: 3.1 10E3/UL (ref 1.6–8.3)
NEUTROPHILS NFR BLD AUTO: 56 %
NRBC # BLD AUTO: 0 10E3/UL
NRBC BLD AUTO-RTO: 0 /100
PLATELET # BLD AUTO: 215 10E3/UL (ref 150–450)
POTASSIUM SERPL-SCNC: 3.9 MMOL/L (ref 3.4–5.3)
RBC # BLD AUTO: 5.26 10E6/UL (ref 4.4–5.9)
RSV RNA SPEC NAA+PROBE: NEGATIVE
SARS-COV-2 RNA RESP QL NAA+PROBE: NEGATIVE
SODIUM SERPL-SCNC: 137 MMOL/L (ref 135–145)
WBC # BLD AUTO: 5.5 10E3/UL (ref 4–11)

## 2024-05-30 PROCEDURE — 87637 SARSCOV2&INF A&B&RSV AMP PRB: CPT | Performed by: STUDENT IN AN ORGANIZED HEALTH CARE EDUCATION/TRAINING PROGRAM

## 2024-05-30 PROCEDURE — 250N000013 HC RX MED GY IP 250 OP 250 PS 637: Performed by: STUDENT IN AN ORGANIZED HEALTH CARE EDUCATION/TRAINING PROGRAM

## 2024-05-30 PROCEDURE — 85025 COMPLETE CBC W/AUTO DIFF WBC: CPT | Performed by: STUDENT IN AN ORGANIZED HEALTH CARE EDUCATION/TRAINING PROGRAM

## 2024-05-30 PROCEDURE — 36415 COLL VENOUS BLD VENIPUNCTURE: CPT | Performed by: STUDENT IN AN ORGANIZED HEALTH CARE EDUCATION/TRAINING PROGRAM

## 2024-05-30 PROCEDURE — 87651 STREP A DNA AMP PROBE: CPT | Performed by: STUDENT IN AN ORGANIZED HEALTH CARE EDUCATION/TRAINING PROGRAM

## 2024-05-30 PROCEDURE — 250N000012 HC RX MED GY IP 250 OP 636 PS 637: Performed by: STUDENT IN AN ORGANIZED HEALTH CARE EDUCATION/TRAINING PROGRAM

## 2024-05-30 PROCEDURE — 99283 EMERGENCY DEPT VISIT LOW MDM: CPT

## 2024-05-30 PROCEDURE — 80048 BASIC METABOLIC PNL TOTAL CA: CPT | Performed by: STUDENT IN AN ORGANIZED HEALTH CARE EDUCATION/TRAINING PROGRAM

## 2024-05-30 RX ORDER — DIPHENHYDRAMINE HCL 25 MG
25 CAPSULE ORAL ONCE
Status: COMPLETED | OUTPATIENT
Start: 2024-05-30 | End: 2024-05-30

## 2024-05-30 RX ORDER — TRIAMCINOLONE ACETONIDE 1 MG/G
CREAM TOPICAL 2 TIMES DAILY
Qty: 80 G | Refills: 0 | Status: SHIPPED | OUTPATIENT
Start: 2024-05-30

## 2024-05-30 RX ORDER — HYDROCORTISONE 2.5 %
CREAM (GRAM) TOPICAL 2 TIMES DAILY
Qty: 30 G | Refills: 0 | Status: SHIPPED | OUTPATIENT
Start: 2024-05-30

## 2024-05-30 RX ORDER — PREDNISONE 20 MG/1
20 TABLET ORAL DAILY
Qty: 2 TABLET | Refills: 0 | Status: SHIPPED | OUTPATIENT
Start: 2024-05-30 | End: 2024-06-01

## 2024-05-30 RX ORDER — PREDNISONE 20 MG/1
40 TABLET ORAL ONCE
Status: COMPLETED | OUTPATIENT
Start: 2024-05-30 | End: 2024-05-30

## 2024-05-30 RX ADMIN — DIPHENHYDRAMINE HYDROCHLORIDE 25 MG: 25 CAPSULE ORAL at 21:58

## 2024-05-30 RX ADMIN — PREDNISONE 40 MG: 20 TABLET ORAL at 21:58

## 2024-05-30 ASSESSMENT — COLUMBIA-SUICIDE SEVERITY RATING SCALE - C-SSRS
2. HAVE YOU ACTUALLY HAD ANY THOUGHTS OF KILLING YOURSELF IN THE PAST MONTH?: NO
1. IN THE PAST MONTH, HAVE YOU WISHED YOU WERE DEAD OR WISHED YOU COULD GO TO SLEEP AND NOT WAKE UP?: NO
6. HAVE YOU EVER DONE ANYTHING, STARTED TO DO ANYTHING, OR PREPARED TO DO ANYTHING TO END YOUR LIFE?: NO

## 2024-05-30 ASSESSMENT — ACTIVITIES OF DAILY LIVING (ADL): ADLS_ACUITY_SCORE: 33

## 2024-05-31 NOTE — ED PROVIDER NOTES
Emergency Department Note      History of Present Illness     Chief Complaint  Rash    HPI  Jason Moran is a 53 year old male with a history of type 2 diabetes who presents to the emergency department for a rash. The patient states hat 2 days ago, he began experiencing itchy bumpy rash on his hands, shoulder, upper chest, and legs. He notes that he tried using a topical cream with no relief. He reports that yesterday, he experienced 1 episode of watery stool and today, experienced 2 more episodes of watery stool.  No blood in the stool.  Denies previous rash similar to this.  He has not taken benadryl for his symptoms. Denies fever, chills, pharyngitis, or cough. Denies nausea, vomiting, or abdominal pain. Denies any chest pain or shortness of breath. Denies black or bloody stools. Denies urinary symptoms.   Denies trying any new foods, soaps, lotions, or detergents. Denies any recent rash contacts. Denies any oral involvement of the rash. Denies any tick or insect bites. Denies trying any new medications. Denies hx of renal disease. He notes that he recently travelled to [place], sleeping in a different bed than he normally does. He reports that he has a hx of type 2 diabetes and within the past 7 days, has had normal BG measurements.    Independent Historian  None    Review of External Notes  None  Past Medical History   Medical History and Problem List  Type 2 diabetes  Neuropathy  Obese  Fatty liver    Medications  Metformin  Methylprednisolone  Ondansetron    Surgical History   Lumbar discectomy, L5-S1  L4-L5 fusion    Physical Exam   Patient Vitals for the past 24 hrs:   BP Temp Temp src Pulse Resp SpO2 Height Weight   05/30/24 2113 (!) 151/94 97  F (36.1  C) Temporal 89 17 98 % 1.829 m (6') 129.9 kg (286 lb 6 oz)     Physical Exam  Vital signs and nursing notes reviewed.    General:  Alert and oriented, no acute distress. Resting on bed  Skin: Skin is warm and dry.  Erythematous papular rash to bilateral  arms and legs and upper chest.  No face or neck or back involvement.  Not vesicular, not purulent, not urticaric  HEENT:   Head: Normocephalic, atraumatic. Facial features symmetric.   Eyes: Conjunctiva pink, sclera white. EOMs grossly intact.   Ears: Auricles without lesion, erythema, or edema.   Nose: Symmetric with no discharge.  Mouth and throat: Lips are moist with no lesions or edema, Buccal and oropharyngeal mucosa is pink and moist without lesions or exudate. Uvula is midline.  Neck: Normal range of motion.   CV:  Heart RRR with no murmurs or extra heart sounds. 2+ radial and tibialis posterior pulses bilaterally. No peripheral edema.  Pulm/Chest: Chest wall expansion symmetric with no increased effort of breathing. Lungs clear and equal to auscultation bilaterally.   Abd: Abdomen is soft and nontender to palpation in all 4 quadrants with no guarding or rebound.   M/S: Moves all extremities spontaneously.  Psych: Normal mood and affect. Behavior is normal.     Diagnostics   Lab Results   Labs Ordered and Resulted from Time of ED Arrival to Time of ED Departure   BASIC METABOLIC PANEL - Abnormal       Result Value    Sodium 137      Potassium 3.9      Chloride 100      Carbon Dioxide (CO2) 25      Anion Gap 12      Urea Nitrogen 12.0      Creatinine 0.83      GFR Estimate >90      Calcium 8.8      Glucose 187 (*)    CBC WITH PLATELETS AND DIFFERENTIAL - Abnormal    WBC Count 5.5      RBC Count 5.26      Hemoglobin 13.9      Hematocrit 44.0      MCV 84      MCH 26.4 (*)     MCHC 31.6      RDW 14.2      Platelet Count 215      % Neutrophils 56      % Lymphocytes 34      % Monocytes 7      % Eosinophils 2      % Basophils 1      % Immature Granulocytes 0      NRBCs per 100 WBC 0      Absolute Neutrophils 3.1      Absolute Lymphocytes 1.9      Absolute Monocytes 0.4      Absolute Eosinophils 0.1      Absolute Basophils 0.0      Absolute Immature Granulocytes 0.0      Absolute NRBCs 0.0     INFLUENZA A/B, RSV, &  SARS-COV2 PCR     Imaging  None    Independent Interpretation  None  ED Course    Medications Administered  Medications   diphenhydrAMINE (BENADRYL) capsule 25 mg (25 mg Oral $Given 5/30/24 2158)   predniSONE (DELTASONE) tablet 40 mg (40 mg Oral $Given 5/30/24 2158)     Discussion of Management  None    Social Determinants of Health adding to complexity of care  None    ED Course  ED Course as of 05/30/24 2237   Thu May 30, 2024   2148 I obtained history and examined the patient as noted above.      2236 I reassessed the patient and updated them on results and plan of care.        Medical Decision Making / Diagnosis   CMS Diagnoses: None    MIPS  None    MDM  Jason Moran is a 53 year old male with a history of type 2 diabetes who presents to the emergency department with complaints of a rash for the past 2 days.  He is also experienced 3 episodes of loose stools.  See HPI.  Mild hypertension but remainder vital signs are normal.  There is no obvious new allergic exposure such as soaps/detergents/foods/bedding/etc. to attribute rash 2.  It is not vesicular or unilateral ruling out shingles.  There are no wrist or webspace lesions to indicate scabies.  It does not look like urticaria.  He has no angioedema, shortness of breath, or wheezing to suggest anaphylaxis or severe allergic reaction.  He has not started new medications recently.  His abdomen is entirely benign on exam.  Given only a couple loose stools, no bloody stools, no fever, no vital sign abnormalities, normal exam, I highly doubt sinister intra-abdominal pathology as etiology of his looser stools.  He appears well-hydrated.  We discussed lab work, which patient like to obtain.  Fortunately, CBC is without leukocytosis or anemia, and BMP is without any electrolyte, metabolic, or renal abnormality with the exception of hyperglycemia with a blood sugar of 187.  Strep is negative.  COVID, influenza, RSV is pending, however the patient does not have any  infectious URI symptoms.  Using reasonable clinical judgment, I feel he is safe for discharge home with supportive management.  This may be a irritant or allergic contact dermatitis.  He received Benadryl and a dose of steroid here in the emergency department.  We discussed risk of steroids and increased blood sugar and through shared decision making we will do short course of low dose steroid to help with symptoms.  Prescribed medium potency steroid for his arms legs and torso, and lower potency steroid for his more sensitive areas.  I recommended prompt follow-up with primary care or dermatology for further evaluation and management.  He will otherwise return to the ED should he develop shortness of breath, fevers, vomiting, blood in his stool, or further emergent concerns.  Patient agreeable to plan and had questions answered.      Disposition  The patient was discharged.     ICD-10 Codes:    ICD-10-CM    1. Rash and nonspecific skin eruption  R21       2. Loose stools  R19.5          Discharge Medications  New Prescriptions    No medications on file     Scribe Disclosure:  CHARLIE, Hill Morton, am serving as a scribe at 9:40 PM on 5/30/2024 to document services personally performed by Ivanna Obregon PA-C based on my observations and the provider's statements to me.       Ivanna Obregon PA-C on 5/30/2024 at 11:06 PM       Ivanna Obregon PA-C  05/30/24 3421

## 2024-05-31 NOTE — DISCHARGE INSTRUCTIONS
Lab work looks good today.  Rash is not consistent with scabies, bedbugs, shingles, cellulitis, drug side effect, Ferrera-Johnsons-Syndrome, anaphylaxis, or other life threatening etiology.  It may be a contact or irritant dermatitis   Follow-up with dermatology or primary care for further evaluation  You may take 25-50 milligrams of Benadryl for itching 3-4 times per day  Use topical steroids as prescribed  Take oral steroid as prescribed- this may briefly raise blood sugar    I will call you if strep is positive and you need antibiotics

## 2024-05-31 NOTE — ED TRIAGE NOTES
Pt states that he has had a rash over his whole body since Tuesday.  He thought that it was hives as it is very itchy.  He states that he was recently in [place].  He states that he has had diarrhea as well.       Triage Assessment (Adult)       Row Name 05/30/24 2112          Triage Assessment    Airway WDL WDL        Cardiac WDL    Cardiac WDL WDL

## 2024-07-14 ENCOUNTER — HEALTH MAINTENANCE LETTER (OUTPATIENT)
Age: 54
End: 2024-07-14

## 2024-09-22 ENCOUNTER — HEALTH MAINTENANCE LETTER (OUTPATIENT)
Age: 54
End: 2024-09-22

## 2025-01-24 ENCOUNTER — HOSPITAL ENCOUNTER (EMERGENCY)
Facility: CLINIC | Age: 55
Discharge: HOME | End: 2025-01-24
Attending: EMERGENCY MEDICINE | Admitting: EMERGENCY MEDICINE
Payer: COMMERCIAL

## 2025-01-24 ENCOUNTER — APPOINTMENT (OUTPATIENT)
Dept: GENERAL RADIOLOGY | Facility: CLINIC | Age: 55
End: 2025-01-24
Attending: EMERGENCY MEDICINE
Payer: COMMERCIAL

## 2025-01-24 VITALS
RESPIRATION RATE: 20 BRPM | DIASTOLIC BLOOD PRESSURE: 82 MMHG | SYSTOLIC BLOOD PRESSURE: 142 MMHG | WEIGHT: 290.13 LBS | HEART RATE: 69 BPM | BODY MASS INDEX: 39.3 KG/M2 | OXYGEN SATURATION: 98 % | HEIGHT: 72 IN | TEMPERATURE: 98.2 F

## 2025-01-24 DIAGNOSIS — J11.1 INFLUENZA-LIKE ILLNESS: ICD-10-CM

## 2025-01-24 LAB
ATRIAL RATE - MUSE: 66 BPM
DIASTOLIC BLOOD PRESSURE - MUSE: NORMAL MMHG
FLUAV RNA SPEC QL NAA+PROBE: NEGATIVE
FLUBV RNA RESP QL NAA+PROBE: NEGATIVE
INTERPRETATION ECG - MUSE: NORMAL
P AXIS - MUSE: 33 DEGREES
PR INTERVAL - MUSE: 146 MS
QRS DURATION - MUSE: 84 MS
QT - MUSE: 412 MS
QTC - MUSE: 431 MS
R AXIS - MUSE: 4 DEGREES
RSV RNA SPEC NAA+PROBE: NEGATIVE
SARS-COV-2 RNA RESP QL NAA+PROBE: NEGATIVE
SYSTOLIC BLOOD PRESSURE - MUSE: NORMAL MMHG
T AXIS - MUSE: -5 DEGREES
VENTRICULAR RATE- MUSE: 66 BPM

## 2025-01-24 PROCEDURE — 93005 ELECTROCARDIOGRAM TRACING: CPT

## 2025-01-24 PROCEDURE — 71046 X-RAY EXAM CHEST 2 VIEWS: CPT

## 2025-01-24 PROCEDURE — 87637 SARSCOV2&INF A&B&RSV AMP PRB: CPT | Performed by: EMERGENCY MEDICINE

## 2025-01-24 PROCEDURE — 99285 EMERGENCY DEPT VISIT HI MDM: CPT | Mod: 25

## 2025-01-24 RX ORDER — AZITHROMYCIN 250 MG/1
TABLET, FILM COATED ORAL
Qty: 6 TABLET | Refills: 0 | Status: SHIPPED | OUTPATIENT
Start: 2025-01-24 | End: 2025-01-29

## 2025-01-24 NOTE — ED PROVIDER NOTES
Emergency Department Note      History of Present Illness     Chief Complaint   Shortness of Breath and Cough      HPI   Jason Moran is a 54 year old male with a history of DM2 and hyperlipidemia who presents to the ED for shortness of breath and a cough. The patient reports developing a sore throat, congestion, sneezing, and cough 9 days ago. He states that he has tried mucinex and delsym, but his cough and sneezing has persisted. He adds that his cough is productive and is accompanied by chest tightness. Patient does have a history of pneumonia, but no history of asthma. Of note, his fiance recently tested positive for Covid.    Independent Historian   None    Review of External Notes   I reviewed 12/12/2024 office visit for comprehensive view of past medical history    Past Medical History     Medical History and Problem List   DM2  Neuropathy  Obesity  Erectile dysfunction  Fatty liver  Hyperlipidemia  Bilateral low back pain with left sided sciatica    Medications   Advil/motrin  Glucophage  Medrol dosepak  Zofran    Surgical History   Past Surgical History:   Procedure Laterality Date    ARTHROSCOPY KNEE WITH MENISCAL REPAIR      BACK SURGERY      L4, fusion    DISCECTOMY LUMBAR POSTERIOR MICROSCOPIC ONE LEVEL Left 4/27/2018    Procedure: DISCECTOMY LUMBAR POSTERIOR MICROSCOPIC ONE LEVEL;  LEFT L5-S1 MICRODISCECTOMY  ;  Surgeon: Mk Rueda MD;  Location:  OR       Physical Exam     Patient Vitals for the past 24 hrs:   BP Temp Temp src Pulse Resp SpO2 Height Weight   01/24/25 0840 (!) 142/82 98.2  F (36.8  C) Temporal 69 20 98 % 1.829 m (6') 131.6 kg (290 lb 2 oz)     Physical Exam  Constitutional: Alert, attentive  HENT:    Nose: Nose normal.    Mouth/Throat: Oropharynx is clear, mucous membranes are moist   Eyes: EOM are normal.   CV: regular rate and rhythm; no murmurs, rubs or gallups  Chest: Effort normal and breath sounds normal.   GI:  There is no tenderness. No distension. Normal bowel  sounds  MSK: Normal range of motion.   Neurological: Alert, attentive  Skin: Skin is warm and dry.      Diagnostics     Lab Results   Labs Ordered and Resulted from Time of ED Arrival to Time of ED Departure   INFLUENZA A/B, RSV AND SARS-COV2 PCR - Normal       Result Value    Influenza A PCR Negative      Influenza B PCR Negative      RSV PCR Negative      SARS CoV2 PCR Negative         Imaging   XR Chest 2 Views   Final Result   IMPRESSION:    1.  No acute cardiopulmonary process.   2.  Normal cardiomediastinal silhouette.          EKG   ECG taken at 0846, ECG read at 0851  Normal sinus rhythm  Minimal voltage criteria for LVH, may be normal variant (R in aVL)  Borderline ECG   Rate 66 bpm. MN interval 146 ms. QRS duration 84 ms. QT/QTc 412/431 ms. P-R-T axes 33 4 -5.    Independent Interpretation   No infiltrate on chest x-ray    ED Course      Discussion of Management   None    ED Course   ED Course as of 01/24/25 1437   Fri Jan 24, 2025   0935 I obtained history and examined the patient as noted above.     0958 I rechecked the patient and discussed lab results.       Additional Documentation  None    Medical Decision Making / Diagnosis     CMS Diagnoses: None    MIPS       None    [facility]   Jason Moran is a 54 year old male with history of type 2 diabetes who presents for evaluation of prolonged initial ISSA and now with cough, greater than 10 days.  Viral panel is negative and chest x-ray is unremarkable.  There is no bronchospasm on exam.  Given prolonged symptoms, will plan for trial of antibiotic for possible sinusitis versus bronchitis.  Plan primary care follow-up for recheck in 3 to 5 days and return precaution for difficulty breathing, weakness, or any other concerns.    Disposition   The patient was discharged.     Diagnosis     ICD-10-CM    1. Influenza-like illness  J11.1            Discharge Medications   New Prescriptions    AZITHROMYCIN (ZITHROMAX Z-SANFORD) 250 MG TABLET    Two tablets on the first  day, then one tablet daily for the next 4 days         Scribe Disclosure:  I, Latricia Scotty, am serving as a scribe at 9:40 AM on 1/24/2025 to document services personally performed by Mk Carr MD based on my observations and the provider's statements to me.        Mk Carr MD  01/24/25 8555

## 2025-01-24 NOTE — LETTER
January 24, 2025      To Whom It May Concern:      Jason Moran was seen in our Emergency Department today, 01/24/25.  Please excuse him from work today.     Sincerely,        Mk Carr MD  Electronically signed

## 2025-01-24 NOTE — LETTER
January 24, 2025      To Whom It May Concern:      Jason Moran was seen in our Emergency Department today, 01/24/25.  I expect his condition to improve over the next 1-3 days.  He may return to work when improved.    Sincerely,        AVA Yang  Electronically signed

## 2025-01-24 NOTE — ED TRIAGE NOTES
Pt arrives with c/o SOB and cough for the last few days. Pt was sick with URI sx but the productive cough and SOB has persisted.      Triage Assessment (Adult)       Row Name 01/24/25 0838          Triage Assessment    Airway WDL WDL        Respiratory WDL    Respiratory WDL X;rhythm/pattern;cough     Rhythm/Pattern, Respiratory shortness of breath     Cough Frequency frequent     Cough Type productive        Skin Circulation/Temperature WDL    Skin Circulation/Temperature WDL WDL        Peripheral/Neurovascular WDL    Peripheral Neurovascular WDL WDL        Cognitive/Neuro/Behavioral WDL    Cognitive/Neuro/Behavioral WDL WDL

## 2025-01-24 NOTE — DISCHARGE INSTRUCTIONS
It was a pleasure taking care of you today. I hope you feel much better soon.  Take azithromycin as prescribed for potential developing bacterial infection.  Please follow-up with your primary care doctor in 1 week as needed.  Return immediately for difficulty breathing or any other concerns.

## 2025-01-24 NOTE — LETTER
January 27, 2025      To Whom It May Concern:      Jason DENNIS Moran was seen in our Emergency Department today, 01/24/25. He may return to work today, 1/27/25.    Sincerely,        Mk Carr MD

## 2025-01-24 NOTE — Clinical Note
Jason Moran was seen and treated in our emergency department on 1/24/2025.         Sincerely,     [facility] Emergency Dept

## 2025-04-05 ENCOUNTER — HEALTH MAINTENANCE LETTER (OUTPATIENT)
Age: 55
End: 2025-04-05

## 2025-07-10 ENCOUNTER — HOSPITAL ENCOUNTER (EMERGENCY)
Facility: CLINIC | Age: 55
Discharge: HOME | End: 2025-07-10
Attending: EMERGENCY MEDICINE

## 2025-07-10 ENCOUNTER — APPOINTMENT (OUTPATIENT)
Dept: CT IMAGING | Facility: CLINIC | Age: 55
End: 2025-07-10
Attending: EMERGENCY MEDICINE

## 2025-07-10 VITALS
WEIGHT: 275.57 LBS | RESPIRATION RATE: 15 BRPM | DIASTOLIC BLOOD PRESSURE: 90 MMHG | OXYGEN SATURATION: 99 % | HEART RATE: 67 BPM | SYSTOLIC BLOOD PRESSURE: 141 MMHG | BODY MASS INDEX: 37.37 KG/M2 | TEMPERATURE: 97 F

## 2025-07-10 DIAGNOSIS — R51.9 ACUTE NONINTRACTABLE HEADACHE, UNSPECIFIED HEADACHE TYPE: ICD-10-CM

## 2025-07-10 DIAGNOSIS — R03.0 ELEVATED BLOOD PRESSURE READING: ICD-10-CM

## 2025-07-10 DIAGNOSIS — E11.65 UNCONTROLLED TYPE 2 DIABETES MELLITUS WITH HYPERGLYCEMIA (H): ICD-10-CM

## 2025-07-10 LAB
ANION GAP SERPL CALCULATED.3IONS-SCNC: 10 MMOL/L (ref 7–15)
ATRIAL RATE - MUSE: 67 BPM
BASOPHILS # BLD AUTO: 0 10E3/UL (ref 0–0.2)
BASOPHILS NFR BLD AUTO: 1 %
BUN SERPL-MCNC: 10 MG/DL (ref 6–20)
CALCIUM SERPL-MCNC: 9.1 MG/DL (ref 8.8–10.4)
CHLORIDE SERPL-SCNC: 98 MMOL/L (ref 98–107)
CREAT SERPL-MCNC: 0.66 MG/DL (ref 0.67–1.17)
DIASTOLIC BLOOD PRESSURE - MUSE: NORMAL MMHG
EGFRCR SERPLBLD CKD-EPI 2021: >90 ML/MIN/1.73M2
EOSINOPHIL # BLD AUTO: 0.1 10E3/UL (ref 0–0.7)
EOSINOPHIL NFR BLD AUTO: 1 %
ERYTHROCYTE [DISTWIDTH] IN BLOOD BY AUTOMATED COUNT: 14.1 % (ref 10–15)
GLUCOSE BLDC GLUCOMTR-MCNC: 287 MG/DL (ref 70–99)
GLUCOSE SERPL-MCNC: 419 MG/DL (ref 70–99)
HCO3 SERPL-SCNC: 25 MMOL/L (ref 22–29)
HCT VFR BLD AUTO: 45.3 % (ref 40–53)
HGB BLD-MCNC: 14.8 G/DL (ref 13.3–17.7)
HOLD SPECIMEN: NORMAL
HOLD SPECIMEN: NORMAL
IMM GRANULOCYTES # BLD: 0 10E3/UL
IMM GRANULOCYTES NFR BLD: 0 %
INTERPRETATION ECG - MUSE: NORMAL
LYMPHOCYTES # BLD AUTO: 1.8 10E3/UL (ref 0.8–5.3)
LYMPHOCYTES NFR BLD AUTO: 29 %
MCH RBC QN AUTO: 26.1 PG (ref 26.5–33)
MCHC RBC AUTO-ENTMCNC: 32.7 G/DL (ref 31.5–36.5)
MCV RBC AUTO: 80 FL (ref 78–100)
MONOCYTES # BLD AUTO: 0.4 10E3/UL (ref 0–1.3)
MONOCYTES NFR BLD AUTO: 6 %
NEUTROPHILS # BLD AUTO: 3.9 10E3/UL (ref 1.6–8.3)
NEUTROPHILS NFR BLD AUTO: 63 %
NRBC # BLD AUTO: 0 10E3/UL
NRBC BLD AUTO-RTO: 0 /100
P AXIS - MUSE: 54 DEGREES
PLATELET # BLD AUTO: 211 10E3/UL (ref 150–450)
POTASSIUM SERPL-SCNC: 4 MMOL/L (ref 3.4–5.3)
PR INTERVAL - MUSE: 138 MS
QRS DURATION - MUSE: 88 MS
QT - MUSE: 386 MS
QTC - MUSE: 407 MS
R AXIS - MUSE: 4 DEGREES
RBC # BLD AUTO: 5.67 10E6/UL (ref 4.4–5.9)
SODIUM SERPL-SCNC: 133 MMOL/L (ref 135–145)
SYSTOLIC BLOOD PRESSURE - MUSE: NORMAL MMHG
T AXIS - MUSE: 0 DEGREES
TROPONIN T SERPL HS-MCNC: 9 NG/L
TROPONIN T SERPL HS-MCNC: 9 NG/L
VENTRICULAR RATE- MUSE: 67 BPM
WBC # BLD AUTO: 6.2 10E3/UL (ref 4–11)

## 2025-07-10 PROCEDURE — 36415 COLL VENOUS BLD VENIPUNCTURE: CPT | Performed by: EMERGENCY MEDICINE

## 2025-07-10 PROCEDURE — 250N000011 HC RX IP 250 OP 636: Performed by: EMERGENCY MEDICINE

## 2025-07-10 PROCEDURE — 80048 BASIC METABOLIC PNL TOTAL CA: CPT | Performed by: EMERGENCY MEDICINE

## 2025-07-10 PROCEDURE — 258N000003 HC RX IP 258 OP 636: Performed by: EMERGENCY MEDICINE

## 2025-07-10 PROCEDURE — 93005 ELECTROCARDIOGRAM TRACING: CPT | Performed by: EMERGENCY MEDICINE

## 2025-07-10 PROCEDURE — 85004 AUTOMATED DIFF WBC COUNT: CPT | Performed by: EMERGENCY MEDICINE

## 2025-07-10 PROCEDURE — 96375 TX/PRO/DX INJ NEW DRUG ADDON: CPT | Performed by: EMERGENCY MEDICINE

## 2025-07-10 PROCEDURE — 70450 CT HEAD/BRAIN W/O DYE: CPT

## 2025-07-10 PROCEDURE — 96361 HYDRATE IV INFUSION ADD-ON: CPT | Performed by: EMERGENCY MEDICINE

## 2025-07-10 PROCEDURE — 82962 GLUCOSE BLOOD TEST: CPT

## 2025-07-10 PROCEDURE — 96374 THER/PROPH/DIAG INJ IV PUSH: CPT | Performed by: EMERGENCY MEDICINE

## 2025-07-10 PROCEDURE — 99285 EMERGENCY DEPT VISIT HI MDM: CPT | Mod: 25 | Performed by: EMERGENCY MEDICINE

## 2025-07-10 PROCEDURE — 84484 ASSAY OF TROPONIN QUANT: CPT | Performed by: EMERGENCY MEDICINE

## 2025-07-10 RX ORDER — METOCLOPRAMIDE HYDROCHLORIDE 5 MG/ML
5 INJECTION INTRAMUSCULAR; INTRAVENOUS ONCE
Status: COMPLETED | OUTPATIENT
Start: 2025-07-10 | End: 2025-07-10

## 2025-07-10 RX ORDER — DIPHENHYDRAMINE HYDROCHLORIDE 50 MG/ML
25 INJECTION, SOLUTION INTRAMUSCULAR; INTRAVENOUS ONCE
Status: COMPLETED | OUTPATIENT
Start: 2025-07-10 | End: 2025-07-10

## 2025-07-10 RX ADMIN — SODIUM CHLORIDE 1000 ML: 9 INJECTION, SOLUTION INTRAVENOUS at 15:44

## 2025-07-10 RX ADMIN — METOCLOPRAMIDE 5 MG: 5 INJECTION, SOLUTION INTRAMUSCULAR; INTRAVENOUS at 15:42

## 2025-07-10 RX ADMIN — DIPHENHYDRAMINE HYDROCHLORIDE 25 MG: 50 INJECTION, SOLUTION INTRAMUSCULAR; INTRAVENOUS at 15:42

## 2025-07-10 ASSESSMENT — ACTIVITIES OF DAILY LIVING (ADL)
ADLS_ACUITY_SCORE: 41

## 2025-07-10 NOTE — DISCHARGE INSTRUCTIONS
Call your doctor to arrange follow-up as soon as possible.  In the meantime start taking your blood pressure once in the morning and once in the evening and keep a log of this to take with you when you see your doctor.  You also need to wear your Dexcom and limit carbohydrates.  Tylenol as needed for headache.  Return immediately if you have worsening symptoms or new concerns of any kind.

## 2025-07-10 NOTE — ED PROVIDER NOTES
Emergency Department Note      History of Present Illness     Chief Complaint   Headache      HPI   Jason Moran is a 55 year old male with a history of NIDDM who presents alone for headache. He has had a primarily right frontal headache for 3 days. Yesterday he had vomiting so when the pain persisted today (5/10) he checked his blood pressure. It was elevated at 167/93 at home (typically normal to minimally elevated and he is not on antihypertensives) so he sought care. He expresses concern for hypertension causing his headache or an aneurysm. He denies fever, trauma, chest pain, dyspnea, or history of migraines. He thinks his vision might be a little blurred.    Independent Historian   None    Review of External Notes   I reviewed primary care note from (12/2024) for a physical     Past Medical History     Medical History and Problem List   DM2  Neuropathy  Obesity  Fatty liver  Hyperlipidemia  Bilateral low back pain with left sided sciatica     Medications   Lipitor  Jeremiah sensor  Glucotrol  Lantus  Semglee  Ozempic  Cialis  Januvia  Metformin    Surgical History   Back surgery   Arthroscopy knee with meniscal repair   Discectomy lumbar posterior microscopic one level     Physical Exam     Patient Vitals for the past 24 hrs:   BP Temp Temp src Pulse Resp SpO2 Weight   07/10/25 1850 (!) 141/90 -- -- 67 15 99 % --   07/10/25 1800 (!) 152/96 -- -- 69 18 98 % --   07/10/25 1730 (!) 151/95 -- -- 67 14 99 % --   07/10/25 1700 (!) 150/94 -- -- 69 16 99 % --   07/10/25 1650 -- -- -- 68 16 97 % --   07/10/25 1640 -- -- -- 77 11 97 % --   07/10/25 1630 (!) 157/90 -- -- 67 15 99 % --   07/10/25 1620 -- -- -- 78 18 97 % --   07/10/25 1540 -- -- -- -- -- 100 % --   07/10/25 1530 (!) 158/92 -- -- 72 -- 96 % --   07/10/25 1520 -- -- -- -- -- 98 % --   07/10/25 1510 (!) 150/92 -- -- 71 -- 98 % --   07/10/25 1448 (!) 156/92 97  F (36.1  C) Temporal 84 16 98 % 125 kg (275 lb 9.2 oz)     Physical Exam  General: Well-developed  and well-nourished. Well appearing middle aged  man. Cooperative.  Head:  Atraumatic.  Eyes:  Conjunctivae, lids, and sclerae are normal.  ENT:    Normal nose. Moist mucous membranes.  Neck:  Supple. Normal range of motion.  CV:  Regular rate and rhythm. Normal heart sounds with no murmurs, rubs, or gallops detected.  Resp:  No respiratory distress. Clear to auscultation bilaterally without decreased breath sounds, wheezing, rales, or rhonchi.  GI:  Soft. Non-distended. Non-tender.    MS:  Normal ROM. No bilateral lower extremity edema.  Skin:  Warm. Non-diaphoretic. No pallor.  Neuro:  Awake. A&Ox3. Normal strength.  Psych: Normal mood and affect. Normal speech.  Vitals reviewed.     Diagnostics     Lab Results   Labs Ordered and Resulted from Time of ED Arrival to Time of ED Departure   BASIC METABOLIC PANEL - Abnormal       Result Value    Sodium 133 (*)     Potassium 4.0      Chloride 98      Carbon Dioxide (CO2) 25      Anion Gap 10      Urea Nitrogen 10.0      Creatinine 0.66 (*)     GFR Estimate >90      Calcium 9.1      Glucose 419 (*)    CBC WITH PLATELETS AND DIFFERENTIAL - Abnormal    WBC Count 6.2      RBC Count 5.67      Hemoglobin 14.8      Hematocrit 45.3      MCV 80      MCH 26.1 (*)     MCHC 32.7      RDW 14.1      Platelet Count 211      % Neutrophils 63      % Lymphocytes 29      % Monocytes 6      % Eosinophils 1      % Basophils 1      % Immature Granulocytes 0      NRBCs per 100 WBC 0      Absolute Neutrophils 3.9      Absolute Lymphocytes 1.8      Absolute Monocytes 0.4      Absolute Eosinophils 0.1      Absolute Basophils 0.0      Absolute Immature Granulocytes 0.0      Absolute NRBCs 0.0     GLUCOSE BY METER - Abnormal    GLUCOSE BY METER POCT 287 (*)    TROPONIN T, HIGH SENSITIVITY - Normal    Troponin T, High Sensitivity 9     TROPONIN T, HIGH SENSITIVITY - Normal    Troponin T, High Sensitivity 9         Imaging   CT Head w/o Contrast   Final Result   IMPRESSION:   1.   Normal head CT.          EKG  Indication: Hypertension  Time: 1456  Rate 67 bpm. ID interval 138. QRS duration 88. QT/QTc 386/407.   Normal sinus rhythm   Minimal voltage criteria for LVH, may be normal variant ( R in aVL)  Borderline ECG   No acute ST changes.  No changes as compared to prior, dated 01/24/25.     Independent Interpretation   CT Head: No intracranial hemorrhage or midline shift.    ED Course      Medications Administered   Medications   sodium chloride 0.9% BOLUS 1,000 mL (0 mLs Intravenous Stopped 7/10/25 1847)   metoclopramide (REGLAN) injection 5 mg (5 mg Intravenous $Given 7/10/25 1542)   diphenhydrAMINE (BENADRYL) injection 25 mg (25 mg Intravenous $Given 7/10/25 1542)     ED Course   ED Course as of 07/10/25 2052   Thu Jul 10, 2025   1522 I obtained history and examined the patient as noted above    1841 I rechecked the patient and explained findings.  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  He is concerned as he had elevated blood pressure reading at home and is not on antihypertensives.  He is well-appearing on exam with moderate hypertension which persisted throughout his emergency department course.  He does not have fever or other infectious symptoms to suggest meningitis and LP is not indicated.    EKG is reassuring without acute ST changes or arrhythmias.  Both initial and repeat troponin are normal.  There is no evidence of endorgan damage related to his moderate hypertension.  Creatinine is normal as well.  He does have significant hyperglycemia to 419 without DKA.  He was given IV fluids and his glucose improved.  Head CT is without intracranial hemorrhage or other obvious cause for his headache.  He felt improved with the  aforementioned IV fluids, Reglan, and Benadryl.  As such, he is appropriate for discharge.  I strongly suspect he has untreated/undiagnosed baseline hypertension based on recent ER visit with similar mild-moderate hypertensive reading though it is an unlikely cause of his headache today.  I have recommended he start checking his blood pressure twice daily and keeping a log of this to review upon follow-up with his primary care provider.  I also recommended he wear his Dexcom, which he is not at this time, and decrease carbohydrates intake given evidence of uncontrolled diabetes.  He can use Tylenol as needed for headache and return if he has worsening symptoms or concerns.  All questions answered.    Disposition   The patient was discharged.     Diagnosis     ICD-10-CM    1. Acute nonintractable headache, unspecified headache type  R51.9       2. Elevated blood pressure reading  R03.0       3. Uncontrolled type 2 diabetes mellitus with hyperglycemia (H)  E11.65            Discharge Medications   Discharge Medication List as of 7/10/2025  6:48 PM            Scribe Disclosure:  I, Maico Miranda, am serving as a scribe at 3:12 PM on 7/10/2025 to document services personally performed by Haydee Win MD based on my observations and the provider's statements to me.        Haydee Win MD  07/10/25 2056

## 2025-07-10 NOTE — ED TRIAGE NOTES
Headache x3 days. Checked his BP today and it was high. Pt vomited once today and is feeling generally unwell. Pt does not take BP meds. Alert and ambulatory.

## 2025-07-10 NOTE — LETTER
Jason Moran was seen and treated in our emergency department on 7/10/2025. Please excuse from work.     If you have any questions or concerns, please don't hesitate to call.    AVA Juárez

## 2025-07-19 ENCOUNTER — HEALTH MAINTENANCE LETTER (OUTPATIENT)
Age: 55
End: 2025-07-19

## (undated) DEVICE — SOL WATER IRRIG 1000ML BOTTLE 2F7114

## (undated) DEVICE — GLOVE PROTEXIS BLUE W/NEU-THERA 7.0  2D73EB70

## (undated) DEVICE — SOL NACL 0.9% IRRIG 1000ML BOTTLE 07138-09

## (undated) DEVICE — PACK SPINE SM CUSTOM SNE15SSFSK

## (undated) DEVICE — ESU ELEC BLADE 4" COATED

## (undated) DEVICE — GLOVE PROTEXIS BLUE W/NEU-THERA 7.5  2D73EB75

## (undated) DEVICE — BLADE CLIPPER 4412A

## (undated) DEVICE — SU VICRYL 3-0 PS-1 18" UND J683

## (undated) DEVICE — MIDAS REX DISSECTING TOOL  14MH30

## (undated) DEVICE — MANIFOLD NEPTUNE 4 PORT 700-20

## (undated) DEVICE — PAD POSITIONING KIT HIP DISP 5844-17

## (undated) DEVICE — SU VICRYL 2-0 CT-2 27" J333H

## (undated) DEVICE — LINEN TOWEL PACK X5 5464

## (undated) DEVICE — SPONGE SURGIFOAM 12 1972

## (undated) DEVICE — DRAPE SHEET REV FOLD 3/4 9349

## (undated) DEVICE — ESU GROUND PAD UNIVERSAL W/O CORD

## (undated) DEVICE — SU VICRYL 0 CT-2 27" J334H

## (undated) DEVICE — PAD POSITIONING KIT CHEST SHEARGUARD DISP LF 5844-13

## (undated) DEVICE — GLOVE PROTEXIS MICRO 7.5  2D73PM75

## (undated) DEVICE — GLOVE PROTEXIS W/NEU-THERA 7.0  2D73TE70

## (undated) DEVICE — DRAPE MICROSOPE ZEISS 52X150" 6120

## (undated) DEVICE — PREP DURAPREP 26ML APL 8630

## (undated) RX ORDER — HYDROMORPHONE HYDROCHLORIDE 1 MG/ML
INJECTION, SOLUTION INTRAMUSCULAR; INTRAVENOUS; SUBCUTANEOUS
Status: DISPENSED
Start: 2018-04-27

## (undated) RX ORDER — FENTANYL CITRATE 50 UG/ML
INJECTION, SOLUTION INTRAMUSCULAR; INTRAVENOUS
Status: DISPENSED
Start: 2018-04-27

## (undated) RX ORDER — GLYCOPYRROLATE 0.2 MG/ML
INJECTION, SOLUTION INTRAMUSCULAR; INTRAVENOUS
Status: DISPENSED
Start: 2018-04-27

## (undated) RX ORDER — NEOSTIGMINE METHYLSULFATE 1 MG/ML
VIAL (ML) INJECTION
Status: DISPENSED
Start: 2018-04-27

## (undated) RX ORDER — ONDANSETRON 2 MG/ML
INJECTION INTRAMUSCULAR; INTRAVENOUS
Status: DISPENSED
Start: 2018-04-27

## (undated) RX ORDER — EPINEPHRINE 1 MG/ML
INJECTION, SOLUTION INTRAMUSCULAR; SUBCUTANEOUS
Status: DISPENSED
Start: 2018-04-27

## (undated) RX ORDER — PREDNISONE 20 MG/1
TABLET ORAL
Status: DISPENSED
Start: 2024-05-30

## (undated) RX ORDER — DIPHENHYDRAMINE HCL 25 MG
CAPSULE ORAL
Status: DISPENSED
Start: 2024-05-30

## (undated) RX ORDER — LIDOCAINE HYDROCHLORIDE 20 MG/ML
INJECTION, SOLUTION EPIDURAL; INFILTRATION; INTRACAUDAL; PERINEURAL
Status: DISPENSED
Start: 2018-04-27

## (undated) RX ORDER — CEFAZOLIN SODIUM 1 G/50ML
SOLUTION INTRAVENOUS
Status: DISPENSED
Start: 2018-04-27

## (undated) RX ORDER — BUPIVACAINE HYDROCHLORIDE 5 MG/ML
INJECTION, SOLUTION EPIDURAL; INTRACAUDAL
Status: DISPENSED
Start: 2018-04-27

## (undated) RX ORDER — METHYLPREDNISOLONE ACETATE 40 MG/ML
INJECTION, SUSPENSION INTRA-ARTICULAR; INTRALESIONAL; INTRAMUSCULAR; SOFT TISSUE
Status: DISPENSED
Start: 2018-04-27

## (undated) RX ORDER — DEXAMETHASONE SODIUM PHOSPHATE 4 MG/ML
INJECTION, SOLUTION INTRA-ARTICULAR; INTRALESIONAL; INTRAMUSCULAR; INTRAVENOUS; SOFT TISSUE
Status: DISPENSED
Start: 2018-04-27

## (undated) RX ORDER — PROPOFOL 10 MG/ML
INJECTION, EMULSION INTRAVENOUS
Status: DISPENSED
Start: 2018-04-27

## (undated) RX ORDER — HYDROCODONE BITARTRATE AND ACETAMINOPHEN 5; 325 MG/1; MG/1
TABLET ORAL
Status: DISPENSED
Start: 2018-04-27

## (undated) RX ORDER — LIDOCAINE HYDROCHLORIDE 10 MG/ML
INJECTION, SOLUTION EPIDURAL; INFILTRATION; INTRACAUDAL; PERINEURAL
Status: DISPENSED
Start: 2018-04-27